# Patient Record
Sex: FEMALE | Race: BLACK OR AFRICAN AMERICAN | NOT HISPANIC OR LATINO | Employment: OTHER | URBAN - METROPOLITAN AREA
[De-identification: names, ages, dates, MRNs, and addresses within clinical notes are randomized per-mention and may not be internally consistent; named-entity substitution may affect disease eponyms.]

---

## 2020-11-17 ENCOUNTER — APPOINTMENT (OUTPATIENT)
Dept: CT IMAGING | Facility: HOSPITAL | Age: 65
End: 2020-11-17

## 2020-11-17 ENCOUNTER — APPOINTMENT (OUTPATIENT)
Dept: GENERAL RADIOLOGY | Facility: HOSPITAL | Age: 65
End: 2020-11-17

## 2020-11-17 ENCOUNTER — HOSPITAL ENCOUNTER (OUTPATIENT)
Facility: HOSPITAL | Age: 65
Setting detail: OBSERVATION
Discharge: HOME HEALTH | End: 2020-11-21
Attending: EMERGENCY MEDICINE | Admitting: HOSPITALIST

## 2020-11-17 DIAGNOSIS — F31.9 BIPOLAR AFFECTIVE DISORDER, REMISSION STATUS UNSPECIFIED (HCC): ICD-10-CM

## 2020-11-17 DIAGNOSIS — R41.82 ALTERED MENTAL STATUS, UNSPECIFIED ALTERED MENTAL STATUS TYPE: Primary | ICD-10-CM

## 2020-11-17 DIAGNOSIS — R40.0 SOMNOLENCE: ICD-10-CM

## 2020-11-17 DIAGNOSIS — F25.0 SCHIZOAFFECTIVE DISORDER, BIPOLAR TYPE (HCC): ICD-10-CM

## 2020-11-17 DIAGNOSIS — I10 ESSENTIAL HYPERTENSION: ICD-10-CM

## 2020-11-17 LAB
ALBUMIN SERPL-MCNC: 3.5 G/DL (ref 3.5–5.2)
ALBUMIN/GLOB SERPL: 1.1 G/DL
ALP SERPL-CCNC: 90 U/L (ref 39–117)
ALT SERPL W P-5'-P-CCNC: 15 U/L (ref 1–33)
AMPHET+METHAMPHET UR QL: NEGATIVE
ANION GAP SERPL CALCULATED.3IONS-SCNC: 8.7 MMOL/L (ref 5–15)
ARTERIAL PATENCY WRIST A: POSITIVE
AST SERPL-CCNC: 24 U/L (ref 1–32)
ATMOSPHERIC PRESS: 764.6 MMHG
B PARAPERT DNA SPEC QL NAA+PROBE: NOT DETECTED
B PERT DNA SPEC QL NAA+PROBE: NOT DETECTED
BACTERIA UR QL AUTO: ABNORMAL /HPF
BARBITURATES UR QL SCN: NEGATIVE
BASE EXCESS BLDA CALC-SCNC: 0.6 MMOL/L (ref 0–2)
BASOPHILS # BLD AUTO: 0.03 10*3/MM3 (ref 0–0.2)
BASOPHILS NFR BLD AUTO: 0.5 % (ref 0–1.5)
BDY SITE: ABNORMAL
BENZODIAZ UR QL SCN: POSITIVE
BILIRUB SERPL-MCNC: 0.6 MG/DL (ref 0–1.2)
BILIRUB UR QL STRIP: NEGATIVE
BUN SERPL-MCNC: 7 MG/DL (ref 8–23)
BUN/CREAT SERPL: 7.9 (ref 7–25)
C PNEUM DNA NPH QL NAA+NON-PROBE: NOT DETECTED
CALCIUM SPEC-SCNC: 9.4 MG/DL (ref 8.6–10.5)
CANNABINOIDS SERPL QL: NEGATIVE
CHLORIDE SERPL-SCNC: 105 MMOL/L (ref 98–107)
CLARITY UR: CLEAR
CO2 SERPL-SCNC: 24.3 MMOL/L (ref 22–29)
COCAINE UR QL: NEGATIVE
COLOR UR: ABNORMAL
CREAT SERPL-MCNC: 0.89 MG/DL (ref 0.57–1)
DEPRECATED RDW RBC AUTO: 44.2 FL (ref 37–54)
EOSINOPHIL # BLD AUTO: 0.1 10*3/MM3 (ref 0–0.4)
EOSINOPHIL NFR BLD AUTO: 1.6 % (ref 0.3–6.2)
ERYTHROCYTE [DISTWIDTH] IN BLOOD BY AUTOMATED COUNT: 13.1 % (ref 12.3–15.4)
ETHANOL BLD-MCNC: <10 MG/DL (ref 0–10)
ETHANOL UR QL: <0.01 %
FLUAV SUBTYP SPEC NAA+PROBE: NOT DETECTED
FLUBV RNA ISLT QL NAA+PROBE: NOT DETECTED
GFR SERPL CREATININE-BSD FRML MDRD: 77 ML/MIN/1.73
GLOBULIN UR ELPH-MCNC: 3.3 GM/DL
GLUCOSE BLDC GLUCOMTR-MCNC: 91 MG/DL (ref 70–130)
GLUCOSE BLDC GLUCOMTR-MCNC: 94 MG/DL (ref 70–130)
GLUCOSE SERPL-MCNC: 97 MG/DL (ref 65–99)
GLUCOSE UR STRIP-MCNC: NEGATIVE MG/DL
HADV DNA SPEC NAA+PROBE: NOT DETECTED
HCO3 BLDA-SCNC: 25.9 MMOL/L (ref 22–28)
HCOV 229E RNA SPEC QL NAA+PROBE: NOT DETECTED
HCOV HKU1 RNA SPEC QL NAA+PROBE: NOT DETECTED
HCOV NL63 RNA SPEC QL NAA+PROBE: NOT DETECTED
HCOV OC43 RNA SPEC QL NAA+PROBE: NOT DETECTED
HCT VFR BLD AUTO: 48.6 % (ref 34–46.6)
HGB BLD-MCNC: 16.2 G/DL (ref 12–15.9)
HGB UR QL STRIP.AUTO: ABNORMAL
HMPV RNA NPH QL NAA+NON-PROBE: NOT DETECTED
HOLD SPECIMEN: NORMAL
HOLD SPECIMEN: NORMAL
HPIV1 RNA SPEC QL NAA+PROBE: NOT DETECTED
HPIV2 RNA SPEC QL NAA+PROBE: NOT DETECTED
HPIV3 RNA NPH QL NAA+PROBE: NOT DETECTED
HPIV4 P GENE NPH QL NAA+PROBE: NOT DETECTED
HYALINE CASTS UR QL AUTO: ABNORMAL /LPF
IMM GRANULOCYTES # BLD AUTO: 0.02 10*3/MM3 (ref 0–0.05)
IMM GRANULOCYTES NFR BLD AUTO: 0.3 % (ref 0–0.5)
INHALED O2 CONCENTRATION: 21 %
KETONES UR QL STRIP: ABNORMAL
LEUKOCYTE ESTERASE UR QL STRIP.AUTO: NEGATIVE
LYMPHOCYTES # BLD AUTO: 2.21 10*3/MM3 (ref 0.7–3.1)
LYMPHOCYTES NFR BLD AUTO: 35.8 % (ref 19.6–45.3)
M PNEUMO IGG SER IA-ACNC: NOT DETECTED
MAGNESIUM SERPL-MCNC: 2 MG/DL (ref 1.6–2.4)
MCH RBC QN AUTO: 31.2 PG (ref 26.6–33)
MCHC RBC AUTO-ENTMCNC: 33.3 G/DL (ref 31.5–35.7)
MCV RBC AUTO: 93.5 FL (ref 79–97)
METHADONE UR QL SCN: NEGATIVE
MODALITY: ABNORMAL
MONOCYTES # BLD AUTO: 0.35 10*3/MM3 (ref 0.1–0.9)
MONOCYTES NFR BLD AUTO: 5.7 % (ref 5–12)
MUCOUS THREADS URNS QL MICRO: ABNORMAL /HPF
NEUTROPHILS NFR BLD AUTO: 3.47 10*3/MM3 (ref 1.7–7)
NEUTROPHILS NFR BLD AUTO: 56.1 % (ref 42.7–76)
NITRITE UR QL STRIP: NEGATIVE
NRBC BLD AUTO-RTO: 0 /100 WBC (ref 0–0.2)
O2 A-A PPRESDIFF RESPIRATORY: 0.6 MMHG
OPIATES UR QL: POSITIVE
OXYCODONE UR QL SCN: NEGATIVE
PCO2 BLDA: 42.5 MM HG (ref 35–45)
PH BLDA: 7.39 PH UNITS (ref 7.35–7.45)
PH UR STRIP.AUTO: <=5 [PH] (ref 5–8)
PLATELET # BLD AUTO: 170 10*3/MM3 (ref 140–450)
PMV BLD AUTO: 11.4 FL (ref 6–12)
PO2 BLDA: 69.7 MM HG (ref 80–100)
POTASSIUM SERPL-SCNC: 4.1 MMOL/L (ref 3.5–5.2)
PROT SERPL-MCNC: 6.8 G/DL (ref 6–8.5)
PROT UR QL STRIP: NEGATIVE
QT INTERVAL: 366 MS
RBC # BLD AUTO: 5.2 10*6/MM3 (ref 3.77–5.28)
RBC # UR: ABNORMAL /HPF
REF LAB TEST METHOD: ABNORMAL
RHINOVIRUS RNA SPEC NAA+PROBE: NOT DETECTED
RSV RNA NPH QL NAA+NON-PROBE: NOT DETECTED
SAO2 % BLDCOA: 93.5 % (ref 92–99)
SARS-COV-2 RNA NPH QL NAA+NON-PROBE: NOT DETECTED
SODIUM SERPL-SCNC: 138 MMOL/L (ref 136–145)
SP GR UR STRIP: 1.02 (ref 1–1.03)
SQUAMOUS #/AREA URNS HPF: ABNORMAL /HPF
TOTAL RATE: 16 BREATHS/MINUTE
TROPONIN T SERPL-MCNC: <0.01 NG/ML (ref 0–0.03)
UROBILINOGEN UR QL STRIP: ABNORMAL
WBC # BLD AUTO: 6.18 10*3/MM3 (ref 3.4–10.8)
WBC UR QL AUTO: ABNORMAL /HPF
WHOLE BLOOD HOLD SPECIMEN: NORMAL
WHOLE BLOOD HOLD SPECIMEN: NORMAL

## 2020-11-17 PROCEDURE — 80307 DRUG TEST PRSMV CHEM ANLYZR: CPT | Performed by: EMERGENCY MEDICINE

## 2020-11-17 PROCEDURE — 71045 X-RAY EXAM CHEST 1 VIEW: CPT

## 2020-11-17 PROCEDURE — 81001 URINALYSIS AUTO W/SCOPE: CPT | Performed by: EMERGENCY MEDICINE

## 2020-11-17 PROCEDURE — G0378 HOSPITAL OBSERVATION PER HR: HCPCS

## 2020-11-17 PROCEDURE — 70450 CT HEAD/BRAIN W/O DYE: CPT

## 2020-11-17 PROCEDURE — 99285 EMERGENCY DEPT VISIT HI MDM: CPT

## 2020-11-17 PROCEDURE — 80053 COMPREHEN METABOLIC PANEL: CPT | Performed by: EMERGENCY MEDICINE

## 2020-11-17 PROCEDURE — 82962 GLUCOSE BLOOD TEST: CPT

## 2020-11-17 PROCEDURE — 82803 BLOOD GASES ANY COMBINATION: CPT

## 2020-11-17 PROCEDURE — 93005 ELECTROCARDIOGRAM TRACING: CPT | Performed by: EMERGENCY MEDICINE

## 2020-11-17 PROCEDURE — 0202U NFCT DS 22 TRGT SARS-COV-2: CPT | Performed by: EMERGENCY MEDICINE

## 2020-11-17 PROCEDURE — 83735 ASSAY OF MAGNESIUM: CPT | Performed by: EMERGENCY MEDICINE

## 2020-11-17 PROCEDURE — 93005 ELECTROCARDIOGRAM TRACING: CPT

## 2020-11-17 PROCEDURE — 93010 ELECTROCARDIOGRAM REPORT: CPT | Performed by: INTERNAL MEDICINE

## 2020-11-17 PROCEDURE — 36600 WITHDRAWAL OF ARTERIAL BLOOD: CPT

## 2020-11-17 PROCEDURE — 84484 ASSAY OF TROPONIN QUANT: CPT | Performed by: EMERGENCY MEDICINE

## 2020-11-17 PROCEDURE — 85025 COMPLETE CBC W/AUTO DIFF WBC: CPT | Performed by: EMERGENCY MEDICINE

## 2020-11-17 RX ORDER — ISOSORBIDE MONONITRATE 30 MG/1
30 TABLET, EXTENDED RELEASE ORAL DAILY
COMMUNITY
Start: 2020-10-26 | End: 2020-11-25

## 2020-11-17 RX ORDER — MIRTAZAPINE 45 MG/1
1 TABLET, FILM COATED ORAL
COMMUNITY
Start: 2020-09-02

## 2020-11-17 RX ORDER — POTASSIUM CHLORIDE 20 MEQ/1
20 TABLET, EXTENDED RELEASE ORAL 3 TIMES DAILY
COMMUNITY
Start: 2020-10-28 | End: 2021-01-26

## 2020-11-17 RX ORDER — HYDROCODONE BITARTRATE AND ACETAMINOPHEN 7.5; 325 MG/1; MG/1
1 TABLET ORAL EVERY 8 HOURS PRN
COMMUNITY
End: 2020-11-21 | Stop reason: HOSPADM

## 2020-11-17 RX ORDER — ZOLPIDEM TARTRATE 10 MG/1
10 TABLET ORAL NIGHTLY PRN
COMMUNITY
End: 2020-11-21 | Stop reason: HOSPADM

## 2020-11-17 RX ORDER — SODIUM CHLORIDE 0.9 % (FLUSH) 0.9 %
10 SYRINGE (ML) INJECTION AS NEEDED
Status: DISCONTINUED | OUTPATIENT
Start: 2020-11-17 | End: 2020-11-21 | Stop reason: HOSPADM

## 2020-11-17 RX ORDER — CLONAZEPAM 1 MG/1
1 TABLET ORAL 2 TIMES DAILY PRN
Status: ON HOLD | COMMUNITY
End: 2020-11-21 | Stop reason: SDUPTHER

## 2020-11-17 RX ORDER — AMOXICILLIN 250 MG
1 CAPSULE ORAL DAILY
COMMUNITY

## 2020-11-17 RX ORDER — QUETIAPINE FUMARATE 300 MG/1
600 TABLET, FILM COATED ORAL NIGHTLY
COMMUNITY
End: 2020-11-21 | Stop reason: HOSPADM

## 2020-11-17 RX ORDER — ISOSORBIDE MONONITRATE 30 MG/1
30 TABLET, EXTENDED RELEASE ORAL DAILY
Status: DISCONTINUED | OUTPATIENT
Start: 2020-11-18 | End: 2020-11-21 | Stop reason: HOSPADM

## 2020-11-17 RX ORDER — NICOTINE 21 MG/24HR
1 PATCH, TRANSDERMAL 24 HOURS TRANSDERMAL EVERY 24 HOURS
COMMUNITY
Start: 2020-10-27 | End: 2021-01-25

## 2020-11-17 RX ORDER — LIOTHYRONINE SODIUM 25 UG/1
25 TABLET ORAL DAILY
Status: DISCONTINUED | OUTPATIENT
Start: 2020-11-18 | End: 2020-11-21 | Stop reason: HOSPADM

## 2020-11-17 RX ORDER — ASPIRIN 81 MG/1
81 TABLET ORAL DAILY
Status: DISCONTINUED | OUTPATIENT
Start: 2020-11-18 | End: 2020-11-21 | Stop reason: HOSPADM

## 2020-11-17 RX ORDER — LISINOPRIL 10 MG/1
10 TABLET ORAL DAILY
Status: DISCONTINUED | OUTPATIENT
Start: 2020-11-18 | End: 2020-11-21 | Stop reason: HOSPADM

## 2020-11-17 RX ORDER — CLONAZEPAM 1 MG/1
1 TABLET ORAL 2 TIMES DAILY PRN
Status: DISCONTINUED | OUTPATIENT
Start: 2020-11-17 | End: 2020-11-19

## 2020-11-17 RX ORDER — LISINOPRIL 10 MG/1
10 TABLET ORAL DAILY
COMMUNITY

## 2020-11-17 RX ORDER — METOPROLOL SUCCINATE 50 MG/1
50 TABLET, EXTENDED RELEASE ORAL DAILY
COMMUNITY
Start: 2020-10-26 | End: 2020-11-25

## 2020-11-17 RX ORDER — POTASSIUM CHLORIDE 750 MG/1
20 TABLET, FILM COATED, EXTENDED RELEASE ORAL 3 TIMES DAILY
Status: DISCONTINUED | OUTPATIENT
Start: 2020-11-17 | End: 2020-11-21 | Stop reason: HOSPADM

## 2020-11-17 RX ORDER — QUETIAPINE FUMARATE 200 MG/1
600 TABLET, FILM COATED ORAL NIGHTLY
Status: DISCONTINUED | OUTPATIENT
Start: 2020-11-17 | End: 2020-11-19

## 2020-11-17 RX ORDER — NICOTINE 21 MG/24HR
1 PATCH, TRANSDERMAL 24 HOURS TRANSDERMAL EVERY 24 HOURS
Status: DISCONTINUED | OUTPATIENT
Start: 2020-11-17 | End: 2020-11-21 | Stop reason: HOSPADM

## 2020-11-17 RX ORDER — ASPIRIN 81 MG/1
81 TABLET ORAL DAILY
COMMUNITY
Start: 2020-10-26 | End: 2020-11-25

## 2020-11-17 RX ORDER — AMOXICILLIN 250 MG
1 CAPSULE ORAL DAILY
Status: DISCONTINUED | OUTPATIENT
Start: 2020-11-18 | End: 2020-11-21 | Stop reason: HOSPADM

## 2020-11-17 RX ORDER — LIOTHYRONINE SODIUM 25 UG/1
1 TABLET ORAL DAILY
COMMUNITY
Start: 2020-09-02

## 2020-11-17 RX ORDER — METOPROLOL SUCCINATE 50 MG/1
50 TABLET, EXTENDED RELEASE ORAL DAILY
Status: DISCONTINUED | OUTPATIENT
Start: 2020-11-18 | End: 2020-11-21 | Stop reason: HOSPADM

## 2020-11-17 NOTE — ED TRIAGE NOTES
Pt is confused since hospital stay for 6 days a month ago.  Ems does not know where she was admitted or what her dx was.  She is unsteady on her feet and has had increased falls.  Is on 2L O2 at baseline    Patient was placed in face mask during first look triage.  Patient was wearing a face mask throughout encounter.  I wore personal protective equipment throughout the encounter.  Hand hygiene was performed before and after patient encounter.

## 2020-11-17 NOTE — ED PROVIDER NOTES
EMERGENCY DEPARTMENT ENCOUNTER    Room Number:  P686/1  Date of encounter:  11/19/2020  PCP: Dixon Miller MD  Historian: EMS and patient      HPI:  Chief Complaint: Altered mental status  A complete HPI/ROS/PMH/PSH/SH/FH are unobtainable due to: Patient is confused and is not able to give a complete history.  Context: Sarah Lopez is a 65 y.o. female who presents to the ED c/o altered mental status.  EMS transported this patient to emergency department.  She informed the triage nurse that this patient has been confused since she was in the hospital approximately 1 month ago.  She had a 6-day stay in the hospital.  EMS does not know which hospital.  She was confused and is remained confused since discharge..  Patient was transported from home.  Patient is uncertain why she was sent here.  She is aware that she is in the hospital.  She thinks that her daughter may have called EMS.  But does not know for certain.  She denies any pain or denies any complaint.  She is unaware of any past medical history that she has.  She is unaware of any recent hospitalization or what facility she was at.  Again she is unaware of any medicines.  Denies any cough or cold symptoms.        Previous Episodes: Unknown  Current Symptoms: Altered mental status.  No family or friends are present here in the emergency department    MEDICAL HISTORY REVIEWED    Try to review records in epic as well as in the [facility] system in the [facility] in [place] as well as the [facility] system here and I cannot find any records on this patient.    PAST MEDICAL HISTORY  Active Ambulatory Problems     Diagnosis Date Noted   • No Active Ambulatory Problems     Resolved Ambulatory Problems     Diagnosis Date Noted   • No Resolved Ambulatory Problems     Past Medical History:   Diagnosis Date   • Anxiety    • COPD (chronic obstructive pulmonary disease) (CMS/[facility])    • Coronary artery disease    • Depression    • Hypertension           PAST SURGICAL HISTORY  Past Surgical History:   Procedure Laterality Date   • HYSTERECTOMY           FAMILY HISTORY  History reviewed. No pertinent family history.      SOCIAL HISTORY  Social History     Socioeconomic History   • Marital status: Single     Spouse name: Not on file   • Number of children: Not on file   • Years of education: Not on file   • Highest education level: Not on file   Tobacco Use   • Smoking status: Former Smoker     Quit date: 10/23/2020     Years since quittin.0   Substance and Sexual Activity   • Alcohol use: Not Currently   • Drug use: Not Currently   • Sexual activity: Defer         ALLERGIES  Patient has no known allergies.        REVIEW OF SYSTEMS  Review of Systems     All systems reviewed and negative except for those discussed in HPI.       PHYSICAL EXAM    I have reviewed the triage vital signs and nursing notes.    ED Triage Vitals [20 1436]   Temp Heart Rate Resp BP SpO2   98.2 °F (36.8 °C) 96 16 134/87 98 %      Temp src Heart Rate Source Patient Position BP Location FiO2 (%)   Tympanic Monitor -- -- --       GENERAL: Elderly female no acute distress.Vital signs on my initial evaluation unremarkable  HENT: nares patent  Head/neck/ face are symmetric without gross deformity, signs of trauma, or swelling  EYES: no scleral icterus, no conjunctival pallor.  NECK: Supple, no meningismus  CV: regular rhythm, regular rate with intact distal pulses.  No murmur or rub  RESPIRATORY: normal effort and no respiratory distress.  Clear to auscultation bilaterally  ABDOMEN: soft and non-tender.  Morbidly obese  MUSCULOSKELETAL: no deformity.  1+ edema to lower extremities that are equal and symmetric.  NEURO: alert and appropriate, moves all extremities, follows commands.  Alert to her name and her age.  She is aware that she is in the hospital.  She is confused to the month and the year.  She will follow commands her cranial nerves II through XII are grossly intact.  She  has no drift to her upper extremities or lower extremities bilaterally.  She appears to be diffusely weak.  I do not appreciate any obvious aphasia or dysarthria.  But she is very forgetful and appears to have a cognitive impairment.  SKIN: warm, dry    Vital signs and nursing notes reviewed.        LAB RESULTS  Recent Results (from the past 24 hour(s))   POC Glucose Once    Collection Time: 11/19/20  6:06 AM    Specimen: Blood   Result Value Ref Range    Glucose 100 70 - 130 mg/dL   CBC (No Diff)    Collection Time: 11/19/20  7:07 AM    Specimen: Blood   Result Value Ref Range    WBC 5.52 3.40 - 10.80 10*3/mm3    RBC 4.84 3.77 - 5.28 10*6/mm3    Hemoglobin 14.7 12.0 - 15.9 g/dL    Hematocrit 45.4 34.0 - 46.6 %    MCV 93.8 79.0 - 97.0 fL    MCH 30.4 26.6 - 33.0 pg    MCHC 32.4 31.5 - 35.7 g/dL    RDW 13.2 12.3 - 15.4 %    RDW-SD 45.2 37.0 - 54.0 fl    MPV 11.1 6.0 - 12.0 fL    Platelets 148 140 - 450 10*3/mm3   Basic Metabolic Panel    Collection Time: 11/19/20  7:07 AM    Specimen: Hand, Left; Blood   Result Value Ref Range    Glucose 109 (H) 65 - 99 mg/dL    BUN 7 (L) 8 - 23 mg/dL    Creatinine 0.76 0.57 - 1.00 mg/dL    Sodium 140 136 - 145 mmol/L    Potassium 4.2 3.5 - 5.2 mmol/L    Chloride 109 (H) 98 - 107 mmol/L    CO2 23.8 22.0 - 29.0 mmol/L    Calcium 8.7 8.6 - 10.5 mg/dL    eGFR  African Amer 93 >60 mL/min/1.73    BUN/Creatinine Ratio 9.2 7.0 - 25.0    Anion Gap 7.2 5.0 - 15.0 mmol/L   TSH    Collection Time: 11/19/20  7:07 AM    Specimen: Hand, Left; Blood   Result Value Ref Range    TSH 0.956 0.270 - 4.200 uIU/mL   T4, Free    Collection Time: 11/19/20  7:07 AM    Specimen: Hand, Left; Blood   Result Value Ref Range    Free T4 0.49 (L) 0.93 - 1.70 ng/dL   T3, Free    Collection Time: 11/19/20  7:07 AM    Specimen: Hand, Left; Blood   Result Value Ref Range    T3, Free 2.13 2.00 - 4.40 pg/mL       Ordered the above labs and independently reviewed the results.        RADIOLOGY  No Radiology Exams Resulted  Within Past 24 Hours    I ordered the above noted radiological studies. Reviewed by me and discussed with radiologist.  See dictation for official radiology interpretation.      PROCEDURES    Procedures      MEDICATIONS GIVEN IN ER    Medications   sodium chloride 0.9 % flush 10 mL (has no administration in time range)   aspirin EC tablet 81 mg (81 mg Oral Given 11/19/20 0836)   isosorbide mononitrate (IMDUR) 24 hr tablet 30 mg (30 mg Oral Given 11/19/20 0836)   liothyronine (CYTOMEL) tablet 25 mcg (25 mcg Oral Given 11/19/20 0836)   lisinopril (PRINIVIL,ZESTRIL) tablet 10 mg (10 mg Oral Given 11/19/20 0836)   metoprolol succinate XL (TOPROL-XL) 24 hr tablet 50 mg (50 mg Oral Given 11/19/20 0836)   mirtazapine (REMERON) tablet 45 mg (45 mg Oral Given 11/19/20 2059)   nicotine (NICODERM CQ) 21 MG/24HR patch 1 patch (1 patch Transdermal Medication Applied 11/19/20 0056)   potassium chloride (K-DUR,KLOR-CON) ER tablet 20 mEq (20 mEq Oral Given 11/19/20 2059)   sennosides-docusate (PERICOLACE) 8.6-50 MG per tablet 1 tablet (1 tablet Oral Given 11/19/20 0836)   sodium chloride 0.9 % flush 10 mL (10 mL Intravenous Given 11/19/20 2100)   sodium chloride 0.9 % flush 10 mL (has no administration in time range)   Pharmacy to Dose enoxaparin (LOVENOX) (has no administration in time range)   acetaminophen (TYLENOL) tablet 650 mg (650 mg Oral Given 11/19/20 0840)   ondansetron (ZOFRAN) tablet 4 mg (has no administration in time range)     Or   ondansetron (ZOFRAN) injection 4 mg (has no administration in time range)   enoxaparin (LOVENOX) syringe 40 mg (40 mg Subcutaneous Given 11/19/20 1139)   albuterol (ACCUNEB) nebulizer solution 0.63 mg (has no administration in time range)   ipratropium-albuterol (DUO-NEB) nebulizer solution 3 mL (3 mL Nebulization Given 11/19/20 1616)   clonazePAM (KlonoPIN) tablet 0.5 mg (has no administration in time range)   QUEtiapine (SEROquel) tablet 400 mg (400 mg Oral Given 11/19/20 2059)          PROGRESS, DATA ANALYSIS, CONSULTS, AND MEDICAL DECISION MAKING    We will try to attempt again touch with the family member.  I have looked in old records in epic and looked at outside records and I have no old records on this patient.  We will check blood work electrolytes and a CT scan of her head.  She is very stable and in no distress.    All labs have been independently reviewed by me.  All radiology studies have been reviewed by me and discussed with radiologist dictating the report.   EKG's independently viewed and interpreted by me.  Discussion below represents my analysis of pertinent findings related to patient's condition, differential diagnosis, treatment plan and final disposition.      ED Course as of Nov 19 2228   Tue Nov 17, 2020 1917 I spoke with the patient's daughter at length.  Patient's daughter stated that she was admitted to [facility] for 5 days on October 19.  EMS was called because they found the patient unresponsive and foaming at the mouth.  Patient need to be intubated.  The daughter stated that her carbon dioxide level got too high.  She was then discharged on October 24 or 25.  They said that she had COPD and there when put her on oxygen to go home with.  Since that time the patient has continued to decline.  She describes the change in her behavior as if sometimes she is in a childlike state.  She will sometimes give a blank stare.  She will not be as responsive and answering questions and she will confuse easily.  Her memory seems to be more impaired as well.  She also mentioned that she is been hospitalized in the past for but told her disease.  She sees a psychiatrist.  The patient controls her own medicines and the daughter is aware that she takes Ambien, Remeron, high blood pressure medicines, hydrocodone for pain medicine, and lorazepam.  There might be a couple other medicines at the daughter does not remember.  Daughter is unaware of the last time she took these  medicines.  Again patient takes these medicines on her own accord.  Daughter did called EMS today she believes this is more psychiatric in origin.  There is been no report of fever coughs or colds.  No vomiting or diarrhea.    [MM]   1942 Discussed with Dr. Kearns who agrees to admit this patient to the hospital.  I also gave Dr. Kearns the daughter's number.    [MM]   1944 On reevaluation patient seems a little more awake.  She is alert and oriented to name age.  She had to think about it and she was able to get that the month is November and is 2020.  I did tell that when I initially saw her.  She is actually more awake and more responsive than initial evaluation.  Hemodynamically she is stable.    [MM]      ED Course User Index  [MM] Michael Barba MD       AS OF 22:28 EST VITALS:    BP - 138/81  HR - 89  TEMP - 96.8 °F (36 °C) (Oral)  02 SATS - 97%        DIAGNOSIS  Final diagnoses:   Altered mental status, unspecified altered mental status type   Bipolar affective disorder, remission status unspecified (CMS/[facility])         DISPOSITION  I have reviewed the test results with my patient and explained the current treatment plan.  I answered all of the patient's questions.  The patient will be admitted to monitor bed at this time.  The patient is not hypotensive and is tolerating their current disease condition well enough for a monitored bed at this time.  The patient's current condition does not require intensive care treatment at this time.             Michael Barba MD  11/19/20 [id number]

## 2020-11-18 PROBLEM — I10 ESSENTIAL HYPERTENSION: Status: ACTIVE | Noted: 2020-11-18

## 2020-11-18 PROBLEM — J44.9 COPD (CHRONIC OBSTRUCTIVE PULMONARY DISEASE) (HCC): Status: ACTIVE | Noted: 2020-11-18

## 2020-11-18 PROBLEM — Z86.711 HISTORY OF PULMONARY EMBOLUS (PE): Status: ACTIVE | Noted: 2020-11-18

## 2020-11-18 PROBLEM — E03.9 ACQUIRED HYPOTHYROIDISM: Status: ACTIVE | Noted: 2020-11-18

## 2020-11-18 PROBLEM — F25.0 SCHIZOAFFECTIVE DISORDER, BIPOLAR TYPE (HCC): Status: ACTIVE | Noted: 2020-11-18

## 2020-11-18 LAB — GLUCOSE BLDC GLUCOMTR-MCNC: 94 MG/DL (ref 70–130)

## 2020-11-18 PROCEDURE — G0378 HOSPITAL OBSERVATION PER HR: HCPCS

## 2020-11-18 PROCEDURE — 96372 THER/PROPH/DIAG INJ SC/IM: CPT

## 2020-11-18 PROCEDURE — 94640 AIRWAY INHALATION TREATMENT: CPT

## 2020-11-18 PROCEDURE — 90791 PSYCH DIAGNOSTIC EVALUATION: CPT

## 2020-11-18 PROCEDURE — 94799 UNLISTED PULMONARY SVC/PX: CPT

## 2020-11-18 PROCEDURE — 82962 GLUCOSE BLOOD TEST: CPT

## 2020-11-18 PROCEDURE — 25010000002 ENOXAPARIN PER 10 MG: Performed by: NURSE PRACTITIONER

## 2020-11-18 RX ORDER — SODIUM CHLORIDE 0.9 % (FLUSH) 0.9 %
10 SYRINGE (ML) INJECTION AS NEEDED
Status: DISCONTINUED | OUTPATIENT
Start: 2020-11-18 | End: 2020-11-21 | Stop reason: HOSPADM

## 2020-11-18 RX ORDER — SODIUM CHLORIDE 0.9 % (FLUSH) 0.9 %
10 SYRINGE (ML) INJECTION EVERY 12 HOURS SCHEDULED
Status: DISCONTINUED | OUTPATIENT
Start: 2020-11-18 | End: 2020-11-21 | Stop reason: HOSPADM

## 2020-11-18 RX ORDER — ONDANSETRON 2 MG/ML
4 INJECTION INTRAMUSCULAR; INTRAVENOUS EVERY 6 HOURS PRN
Status: DISCONTINUED | OUTPATIENT
Start: 2020-11-18 | End: 2020-11-21 | Stop reason: HOSPADM

## 2020-11-18 RX ORDER — ONDANSETRON 4 MG/1
4 TABLET, FILM COATED ORAL EVERY 6 HOURS PRN
Status: DISCONTINUED | OUTPATIENT
Start: 2020-11-18 | End: 2020-11-21 | Stop reason: HOSPADM

## 2020-11-18 RX ORDER — ALBUTEROL SULFATE 0.63 MG/3ML
0.63 SOLUTION RESPIRATORY (INHALATION) EVERY 6 HOURS PRN
Status: DISCONTINUED | OUTPATIENT
Start: 2020-11-18 | End: 2020-11-21 | Stop reason: HOSPADM

## 2020-11-18 RX ORDER — ACETAMINOPHEN 325 MG/1
650 TABLET ORAL EVERY 4 HOURS PRN
Status: DISCONTINUED | OUTPATIENT
Start: 2020-11-18 | End: 2020-11-21 | Stop reason: HOSPADM

## 2020-11-18 RX ORDER — IPRATROPIUM BROMIDE AND ALBUTEROL SULFATE 2.5; .5 MG/3ML; MG/3ML
3 SOLUTION RESPIRATORY (INHALATION)
Status: DISCONTINUED | OUTPATIENT
Start: 2020-11-18 | End: 2020-11-21 | Stop reason: HOSPADM

## 2020-11-18 RX ADMIN — LIOTHYRONINE SODIUM 25 MCG: 25 TABLET ORAL at 10:58

## 2020-11-18 RX ADMIN — POTASSIUM CHLORIDE 20 MEQ: 750 TABLET, EXTENDED RELEASE ORAL at 10:59

## 2020-11-18 RX ADMIN — ASPIRIN 81 MG: 81 TABLET, FILM COATED ORAL at 10:59

## 2020-11-18 RX ADMIN — ISOSORBIDE MONONITRATE 30 MG: 30 TABLET ORAL at 10:59

## 2020-11-18 RX ADMIN — SODIUM CHLORIDE, PRESERVATIVE FREE 10 ML: 5 INJECTION INTRAVENOUS at 22:28

## 2020-11-18 RX ADMIN — IPRATROPIUM BROMIDE AND ALBUTEROL SULFATE 3 ML: 2.5; .5 SOLUTION RESPIRATORY (INHALATION) at 23:12

## 2020-11-18 RX ADMIN — QUETIAPINE FUMARATE 600 MG: 200 TABLET ORAL at 22:27

## 2020-11-18 RX ADMIN — ENOXAPARIN SODIUM 40 MG: 100 INJECTION SUBCUTANEOUS at 16:11

## 2020-11-18 RX ADMIN — SODIUM CHLORIDE, PRESERVATIVE FREE 10 ML: 5 INJECTION INTRAVENOUS at 16:11

## 2020-11-18 RX ADMIN — POTASSIUM CHLORIDE 20 MEQ: 750 TABLET, EXTENDED RELEASE ORAL at 16:11

## 2020-11-18 RX ADMIN — METOPROLOL SUCCINATE 50 MG: 50 TABLET, EXTENDED RELEASE ORAL at 10:59

## 2020-11-18 RX ADMIN — LISINOPRIL 10 MG: 10 TABLET ORAL at 10:59

## 2020-11-18 RX ADMIN — MIRTAZAPINE 45 MG: 30 TABLET, FILM COATED ORAL at 22:27

## 2020-11-18 RX ADMIN — DOCUSATE SODIUM 50MG AND SENNOSIDES 8.6MG 1 TABLET: 8.6; 5 TABLET, FILM COATED ORAL at 10:59

## 2020-11-18 RX ADMIN — POTASSIUM CHLORIDE 20 MEQ: 750 TABLET, EXTENDED RELEASE ORAL at 22:28

## 2020-11-18 NOTE — PROGRESS NOTES
Pharmacy consult for Lovenox dosing    Sarah Lopez is a 65 y.o. female 106 kg (233 lb 3.2 oz).    Indication:VTE prophylaxis   Physician:Melani HAN    Estimated Creatinine Clearance: 77.6 mL/min (by C-G formula based on SCr of 0.89 mg/dL).  Creatinine   Date Value Ref Range Status   11/17/2020 0.89 0.57 - 1.00 mg/dL Final     Platelets   Date Value Ref Range Status   11/17/2020 170 140 - 450 10*3/mm3 Final     Hematocrit   Date Value Ref Range Status   11/17/2020 48.6 (H) 34.0 - 46.6 % Final     Hemoglobin   Date Value Ref Range Status   11/17/2020 16.2 (H) 12.0 - 15.9 g/dL Final         PLAN:  Lovenox 40 mg sc Q 24 hr.  Will monitor and adjust as needed.      Nidia Hobbs PharmD, BCPS  11/18/20 11:15 EST

## 2020-11-18 NOTE — H&P
Patient Name:  Sarah Lopez  YOB: 1955  MRN:  [id number]  Admit Date:  11/17/2020  Patient Care Team:  Dixon Miller MD as PCP - General (Internal Medicine)      Subjective   History Present Illness     Chief Complaint   Patient presents with   • Altered Mental Status   • Weakness - Generalized       Ms. Lopez is a 65 y.o. female with a history of COPD, CAD, HTN, depression/anxiety, bipolar/schizoaffective disorder, hypothyroidism that presents to [facility] complaining of family concern of AMS. Pt is not a good historian, most information taken from ED/Psychiatry notes. According to daughter report, pt has diagnosis of bipolar/schizoaffective disorder, followed by [facility] psychiatry. Pt has had prior [facility] admission in 2011 following death of . She has apparently been managed well until recently when she had a hospitalization in October at [facility] due to COPD. Daughter has noticed more memory issues/forgetfulness for the past 6 months. Pt has been taking of her mother since October when she suffered a fall. Apparently pt has not been sleeping well despite use of Remeron, Seroquel, Ativan, and Ambien. No report of recent illnesses such as fever, increased shortness of breath, dysuria. On exam, pt was alert, oriented to month, year, place; she reported she was admitted because she had fallen although is not reported in records. No specific pain reported. She was able to state that she takes care of her mother. She reports having decreased appetite. She did seem distracted, slow to verbally respond at times.     Afebrile. Not hypotensive. Sats stable on O2 2L NC. RVP/Covid 19 negative. UA negative for infection. UDS + benzo/opiate. Electrolytes/renal function stable. Trop neg. Hgb 16, WBC 6. Ethanol < 10. CXR no focal consolidation. CTH negative.     Review of Systems   Constitutional: Positive for appetite change. Negative for fever.   HENT: Negative for congestion.     Respiratory: Negative for cough and shortness of breath.    Cardiovascular: Negative.    Gastrointestinal: Negative.    Genitourinary: Negative for dysuria.   Musculoskeletal: Negative.    Skin: Negative.    Neurological: Negative.    Psychiatric/Behavioral: Positive for confusion, decreased concentration and sleep disturbance.        Personal History     Past Medical History:   Diagnosis Date   • Anxiety    • COPD (chronic obstructive pulmonary disease) (CMS/[facility])    • Coronary artery disease    • Depression    • Hypertension      Past Surgical History:   Procedure Laterality Date   • HYSTERECTOMY       History reviewed. No pertinent family history.  Social History     Tobacco Use   • Smoking status: Former Smoker     Quit date: 10/23/2020     Years since quittin.0   Substance Use Topics   • Alcohol use: Not Currently   • Drug use: Not Currently     No current facility-administered medications on file prior to encounter.      Current Outpatient Medications on File Prior to Encounter   Medication Sig Dispense Refill   • aspirin (aspirin) 81 MG EC tablet Take 81 mg by mouth Daily.     • clonazePAM (KlonoPIN) 1 MG tablet Take 1 mg by mouth 2 (Two) Times a Day As Needed.     • HYDROcodone-acetaminophen (NORCO) 7.5-325 MG per tablet Take 1 tablet by mouth Every 8 (Eight) Hours As Needed for Moderate Pain .     • isosorbide mononitrate (IMDUR) 30 MG 24 hr tablet Take 30 mg by mouth Daily.     • liothyronine (CYTOMEL) 25 MCG tablet Take 1 tablet by mouth Daily.     • lisinopril (PRINIVIL,ZESTRIL) 10 MG tablet Take 10 mg by mouth Daily.     • metoprolol succinate XL (TOPROL-XL) 50 MG 24 hr tablet Take 50 mg by mouth Daily.     • mirtazapine (REMERON) 45 MG tablet Take 1 tablet by mouth every night at bedtime.     • nicotine (NICODERM CQ) 21 MG/24HR patch Place 1 patch on the skin as directed by provider Daily.     • potassium chloride (K-DUR,KLOR-CON) 20 MEQ CR tablet Take 20 mEq by mouth 3 (Three) Times a Day.     •  QUEtiapine (SEROquel) 300 MG tablet Take 600 mg by mouth Every Night.     • sennosides-docusate (Senexon-S) 8.6-50 MG per tablet Take 1 tablet by mouth Daily.     • zolpidem (Ambien) 10 MG tablet Take 10 mg by mouth At Night As Needed for Sleep.       No Known Allergies    Objective    Objective     Vital Signs  Temp:  [97.3 °F (36.3 °C)-98.4 °F (36.9 °C)] 98.2 °F (36.8 °C)  Heart Rate:  [85-96] 85  Resp:  [16-18] 16  BP: (134-150)/(87-92) 150/91  SpO2:  [91 %-98 %] 95 %  on  Flow (L/min):  [2] 2;   Device (Oxygen Therapy): nasal cannula  Body mass index is 37.64 kg/m².    Physical Exam  Vitals signs and nursing note reviewed.   Constitutional:       General: She is not in acute distress.     Appearance: She is obese.   HENT:      Head: Normocephalic.   Eyes:      Conjunctiva/sclera: Conjunctivae normal.   Neck:      Musculoskeletal: Normal range of motion and neck supple.   Cardiovascular:      Rate and Rhythm: Normal rate and regular rhythm.   Pulmonary:      Effort: Pulmonary effort is normal. No respiratory distress.      Breath sounds: Normal breath sounds.   Abdominal:      General: Bowel sounds are normal.      Palpations: Abdomen is soft.   Musculoskeletal:      Right lower leg: No edema.      Left lower leg: No edema.   Skin:     General: Skin is warm and dry.   Neurological:      General: No focal deficit present.      Mental Status: She is alert.   Psychiatric:         Mood and Affect: Mood normal.         Speech: Speech normal.         Behavior: Behavior is slowed.      Comments: Decreased attention; distracted at times         Results Review:  I reviewed the patient's new clinical results.  I reviewed the patient's new imaging results and agree with the interpretation.  I reviewed the patient's other test results and agree with the interpretation  I personally viewed and interpreted the patient's EKG/Telemetry data    Lab Results (last 24 hours)     Procedure Component Value Units Date/Time    Blood  Gas, Arterial - [[id number]]  (Abnormal) Collected: 11/17/20 1814    Specimen: Arterial Blood Updated: 11/17/20 1817     Site Arterial: left radial     Augusto's Test Positive     pH, Arterial 7.392 pH units      pCO2, Arterial 42.5 mm Hg      pO2, Arterial 69.7 mm Hg      HCO3, Arterial 25.9 mmol/L      Base Excess, Arterial 0.6 mmol/L      O2 Saturation Calculated 93.5 %      A-a Gradiant 0.6 mmHg      Barometric Pressure for Blood Gas 764.6 mmHg      Modality Room Air     FIO2 21 %      Rate 16 Breaths/minute     CBC & Differential [[id number]]  (Abnormal) Collected: 11/17/20 1818    Specimen: Blood Updated: 11/17/20 1835    Narrative:      The following orders were created for panel order CBC & Differential.  Procedure                               Abnormality         Status                     ---------                               -----------         ------                     CBC Auto Differential[[id number]]        Abnormal            Final result                 Please view results for these tests on the individual orders.    Comprehensive Metabolic Panel [[id number]]  (Abnormal) Collected: 11/17/20 1818    Specimen: Blood Updated: 11/17/20 1856     Glucose 97 mg/dL      BUN 7 mg/dL      Creatinine 0.89 mg/dL      Sodium 138 mmol/L      Potassium 4.1 mmol/L      Chloride 105 mmol/L      CO2 24.3 mmol/L      Calcium 9.4 mg/dL      Total Protein 6.8 g/dL      Albumin 3.50 g/dL      ALT (SGPT) 15 U/L      AST (SGOT) 24 U/L      Alkaline Phosphatase 90 U/L      Total Bilirubin 0.6 mg/dL      eGFR   Amer 77 mL/min/1.73      Globulin 3.3 gm/dL      A/G Ratio 1.1 g/dL      BUN/Creatinine Ratio 7.9     Anion Gap 8.7 mmol/L     Narrative:      GFR Normal >60  Chronic Kidney Disease <60  Kidney Failure <15      Troponin [[id number]]  (Normal) Collected: 11/17/20 1818    Specimen: Blood Updated: 11/17/20 1856     Troponin T <0.010 ng/mL     Narrative:      Troponin T Reference Range:  <= 0.03 ng/mL-   Negative for  AMI  >0.03 ng/mL-     Abnormal for myocardial necrosis.  Clinicians would have to utilize clinical acumen, EKG, Troponin and serial changes to determine if it is an Acute Myocardial Infarction or myocardial injury due to an underlying chronic condition.       Results may be falsely decreased if patient taking Biotin.      Magnesium [[id number]]  (Normal) Collected: 11/17/20 1818    Specimen: Blood Updated: 11/17/20 1856     Magnesium 2.0 mg/dL     CBC Auto Differential [[id number]]  (Abnormal) Collected: 11/17/20 1818    Specimen: Blood Updated: 11/17/20 1835     WBC 6.18 10*3/mm3      RBC 5.20 10*6/mm3      Hemoglobin 16.2 g/dL      Hematocrit 48.6 %      MCV 93.5 fL      MCH 31.2 pg      MCHC 33.3 g/dL      RDW 13.1 %      RDW-SD 44.2 fl      MPV 11.4 fL      Platelets 170 10*3/mm3      Neutrophil % 56.1 %      Lymphocyte % 35.8 %      Monocyte % 5.7 %      Eosinophil % 1.6 %      Basophil % 0.5 %      Immature Grans % 0.3 %      Neutrophils, Absolute 3.47 10*3/mm3      Lymphocytes, Absolute 2.21 10*3/mm3      Monocytes, Absolute 0.35 10*3/mm3      Eosinophils, Absolute 0.10 10*3/mm3      Basophils, Absolute 0.03 10*3/mm3      Immature Grans, Absolute 0.02 10*3/mm3      nRBC 0.0 /100 WBC     Ethanol [[id number]] Collected: 11/17/20 1818    Specimen: Blood Updated: 11/17/20 1856     Ethanol <10 mg/dL      Ethanol % <0.010 %     Urinalysis With Microscopic If Indicated (No Culture) - Urine, Clean Catch [[id number]]  (Abnormal) Collected: 11/17/20 2013    Specimen: Urine, Clean Catch Updated: 11/17/20 2032     Color, UA Dark Yellow     Appearance, UA Clear     pH, UA <=5.0     Specific Gravity, UA 1.025     Glucose, UA Negative     Ketones, UA Trace     Bilirubin, UA Negative     Blood, UA Trace     Protein, UA Negative     Leuk Esterase, UA Negative     Nitrite, UA Negative     Urobilinogen, UA 1.0 E.U./dL    Urine Drug Screen - Urine, Clean Catch [[id number]]  (Abnormal) Collected: 11/17/20 2013    Specimen: Urine,  Clean Catch Updated: 11/17/20 2049     Amphet/Methamphet, Screen Negative     Barbiturates Screen, Urine Negative     Benzodiazepine Screen, Urine Positive     Cocaine Screen, Urine Negative     Opiate Screen Positive     THC, Screen, Urine Negative     Methadone Screen, Urine Negative     Oxycodone Screen, Urine Negative    Narrative:      Negative Thresholds For Drugs Screened:     Amphetamines               500 ng/ml   Barbiturates               200 ng/ml   Benzodiazepines            100 ng/ml   Cocaine                    300 ng/ml   Methadone                  300 ng/ml   Opiates                    300 ng/ml   Oxycodone                  100 ng/ml   THC                        50 ng/ml    The Normal Value for all drugs tested is negative. This report includes final unconfirmed screening results to be used for medical treatment purposes only. Unconfirmed results must not be used for non-medical purposes such as employment or legal testing. Clinical consideration should be applied to any drug of abuse test, particulary when unconfirmed results are used.    Urinalysis, Microscopic Only - Urine, Clean Catch [[id number]]  (Abnormal) Collected: 11/17/20 2013    Specimen: Urine, Clean Catch Updated: 11/17/20 2048     RBC, UA None Seen /HPF      WBC, UA None Seen /HPF      Bacteria, UA Trace /HPF      Squamous Epithelial Cells, UA 0-2 /HPF      Hyaline Casts, UA None Seen /LPF      Mucus, UA Small/1+ /HPF      Methodology Manual Light Microscopy    POC Glucose Once [[id number]]  (Normal) Collected: 11/17/20 2032    Specimen: Blood Updated: 11/17/20 2034     Glucose 91 mg/dL     COVID PRE-OP / PRE-PROCEDURE SCREENING ORDER (NO ISOLATION) - Swab, Nasopharynx [[id number]]  (Normal) Collected: 11/17/20 2044    Specimen: Swab from Nasopharynx Updated: 11/17/20 2155    Narrative:      The following orders were created for panel order COVID PRE-OP / PRE-PROCEDURE SCREENING ORDER (NO ISOLATION) - Swab, Nasopharynx.  Procedure                                Abnormality         Status                     ---------                               -----------         ------                     Respiratory Panel PCR w/...[[id number]]  Normal              Final result                 Please view results for these tests on the individual orders.    Respiratory Panel PCR w/COVID-19(SARS-CoV-2) CHANO/FAIZAN/GIAN/PAD/COR/MAD/MIRELA In-House, NP Swab in UTM/VTM, 3-4 HR TAT - Swab, Nasopharynx [[id number]]  (Normal) Collected: 11/17/20 2044    Specimen: Swab from Nasopharynx Updated: 11/17/20 2155     ADENOVIRUS, PCR Not Detected     Coronavirus 229E Not Detected     Coronavirus HKU1 Not Detected     Coronavirus NL63 Not Detected     Coronavirus OC43 Not Detected     COVID19 Not Detected     Human Metapneumovirus Not Detected     Human Rhinovirus/Enterovirus Not Detected     Influenza A PCR Not Detected     Influenza B PCR Not Detected     Parainfluenza Virus 1 Not Detected     Parainfluenza Virus 2 Not Detected     Parainfluenza Virus 3 Not Detected     Parainfluenza Virus 4 Not Detected     RSV, PCR Not Detected     Bordetella pertussis pcr Not Detected     Bordetella parapertussis PCR Not Detected     Chlamydophila pneumoniae PCR Not Detected     Mycoplasma pneumo by PCR Not Detected    Narrative:      Fact sheet for providers: https://docs.K[employer]/wp-content/uploads/TZU7160-4458-OF0.1-EUA-Provider-Fact-Sheet-3.pdf    Fact sheet for patients: https://docs.[employer]/wp-content/uploads/BJQ0270-5982-IA9.1-EUA-Patient-Fact-Sheet-1.pdf    POC Glucose Once [[id number]]  (Normal) Collected: 11/17/20 2331    Specimen: Blood Updated: 11/17/20 2332     Glucose 94 mg/dL           Imaging Results (Last 24 Hours)     Procedure Component Value Units Date/Time    CT Head Without Contrast [[id number]] Collected: 11/17/20 2001     Updated: 11/17/20 2020    Narrative:      CT HEAD WITHOUT CONTRAST     HISTORY: Confusion     TECHNIQUE:  Head CT includes axial imaging from the  base of skull to the  vertex without intravenous contrast. Radiation dose reduction techniques  were utilized, including automated exposure control and exposure  modulation based on body size.     COMPARISON:None     FINDINGS: There are no abnormal areas of increased attenuation  intra-axially to suggest hemorrhage. No extra-axial fluid collection is  observed. There is no evidence for cerebral edema, mass effect or shift  of the midline structures. Bone windows demonstrate no calvarial  abnormality. Mastoid air cells and visualized paranasal sinuses appear  clear.        Impression:      No evidence for acute intracranial abnormality.     This report was finalized on 11/17/2020 8:16 PM by Dr. Truman Kelly M.D.       XR Chest 1 View [[id number]] Collected: 11/17/20 1741     Updated: 11/17/20 1746    Narrative:      XR CHEST 1 VW-     HISTORY: Female who is 65 years-old, weakness     TECHNIQUE: Frontal view of the chest     COMPARISON: None available     FINDINGS: The heart size is normal. Aorta is calcified. Slight  prominence of vascular and interstitial markings. No focal pulmonary  consolidation, pleural effusion, or pneumothorax. Right hemidiaphragm is  elevated. No acute osseous process.       Impression:      No focal pulmonary consolidation. Follow-up as clinical  indications persist.     This report was finalized on 11/17/2020 5:42 PM by Dr. Emanuel Steiner M.D.                  ECG 12 Lead   Final Result   HEART RATE= 95  bpm   RR Interval= 632  ms   MT Interval= 155  ms   P Horizontal Axis= 24  deg   P Front Axis= 51  deg   QRSD Interval= 101  ms   QT Interval= 366  ms   QRS Axis= -55  deg   T Wave Axis= 83  deg   - ABNORMAL ECG -   Sinus rhythm   Left anterior fascicular block   Left ventricular hypertrophy   Borderline T abnormalities, anterior leads   NO PRIOR TRACING AVAILABLE FOR COMPARISON   Electronically Signed By: Jose Wang ([facility]) 17-Nov-2020 17:06:10   Date and Time of Study:  2020-11-17 16:02:49           Assessment/Plan     Active Hospital Problems    Diagnosis  POA   • **Altered mental status [R41.82]  Yes   • COPD (chronic obstructive pulmonary disease) (CMS/[facility]) [J44.9]  Yes   • Schizoaffective disorder, bipolar type (CMS/[facility]) [F25.0]  Yes   • Essential hypertension [I10]  Yes   • Acquired hypothyroidism [E03.9]  Yes   • History of pulmonary embolus (PE) [Z86.711]  Yes      Resolved Hospital Problems   No resolved problems to display.       Ms. Lopez is a 65 y.o. female with a history of COPD, CAD, HTN, depression/anxiety, bipolar/schizoaffective disorder, hypothyroidism who is admitted for AMS    AMS/Hx schizoaffective disorder/bipolar:   -No evidence of infection; CTH negative. ? Medication effects vs underlying psychiatric condition  -Access has seen; Dr. Irvin consulted. Previous [facility] admission 2011. [facility]  -Will hold Ambien & hydrocodone; continue Seroquel, Remeron, PRN klonopin for now  -Neurology also consulted to evaluate    COPD:  -Not in acute exacerbation  -Continue inhalers, O2  -CXR negative    HTN:  -Continue home meds  -BP controlled    Hypothyroidism:  -Check TSH, FT4 (3.440, 0.58 3/2020)  -liothyronine    Nursing to verify home meds as [facility] summary reports pt takes Eliquis for hx DVT/PE  Start lovenox for ppx for now    PT eval & tx    · I discussed the patient's findings and my recommendations with patient, nursing staff and Dr. Mijares.    VTE Prophylaxis - Pharmacy to dose Lovenox.  Code Status - Full code.       EMELY Mcadams  [facility]  11/18/20  11:51 EST

## 2020-11-18 NOTE — PLAN OF CARE
Goal Outcome Evaluation:  Plan of Care Reviewed With: patient  Progress: no change  Outcome Summary: pt admitted for AMS. EMS called by family after weeks of confusion post admission in October. Daughter concerned pt may be overmedcating contributing to sx. CT head negative. Pt mental status at baseline, A&Ox4. VSS, incontinent. Medications held secondary to pt somnolence. Passed bedside swallow. Neurology consulted. Will continue to monitor and await further orders.

## 2020-11-18 NOTE — NURSING NOTE
Daughter does not want patient to talk to her grandmother (Patients mother) due to their unhealthy co-dependent relationship.  This is apparently a pattern.  If at all possible staff could say patient is asleep when the grandmother calls.

## 2020-11-18 NOTE — CONSULTS
"Pt is a 64 yo female seen in ED for AMS.  She is single, reports she lives with her daughter and is retired.  Pt is alert and oriented to self and place only.  Thinks the year is , unsure of president, thinks she is 64 yo.  She is pleasant, affect and mood are appropriate and she is cooperative with interview.  Pt reports no concerns.  She reports that she has no hx of psychiatric issues, does not take any psych meds.  She denies any psych hospitalizations and does not have substance/alcohol abuse issues.  She reports she was just in the hospital recently, cannot remember which one, and was \"not feeling too good\".  She cannot clarify exactly what her symptoms were at that time. Pt denies SI, HI and hallucinations. Pt give permission to call her daughter, Lambert Lopez at [phone].  Spoke with pt's daughter at length.  She reports that pt has a hx of bipolar/schizoaffective d/o and sees psychiatry at [facility].  She is unsure which MD, because they \"switch a lot\".  Pt is current with the practice and daughter notified them that pt was in hospital.  Pt is currently prescribed Seroquel 600mg nightly, Konopin 1mg BID PRN, and Remeron 45mg nightly. She also takes Ambien 10mg nightly for sleep.  Daughter states that pt has been doing \"just fine\" on this regimen for several years, though psychiatry had suggested lowering doses recently.  Daughter reports that pt was last hospitalized on [facility] in  for about a week.  This happened after pt's  , and pt started experiencing depression then hallucinations.  Daughter states that pt has been doing well since then but has been under \"a lot of pressure caring for her mother\".  Pt has been living with her mother and caring for her since October after a fall.  Pt has not been sleeping well r/t to getting up with her mother at night.  Daughter is worried because pt is taking all her night time meds but isn't able to \"sleep them off\" and has been getting more " "confused.  Daughter states pt has \"not been right since being out of hospital\".  Pt was hospitalized in October at [place] for COPD.  Daughter states she has noticed pt being more forgetful/memory issues for the last 6 months.  Daughter states pt has taken on too much and \"I'm afraid it's causing a nervous breakdown\".  Dr Irvin has been consulted.  Access center will follow.     "

## 2020-11-18 NOTE — PAYOR COMM NOTE
"Sarah Lopez (65 y.o. Female)     ATT: REQUESTING AUTHORIZATION FOR OBSERVATION SERVICES; TESTING AND TREATMENT                                                         #[id number]    PLEASE REPLY TO [facility] DEPT: ANNAMARIA HEBERT RN/CCP; [phone],  [phone]           Date of Birth Social Security Number Address Home Phone MRN    1955  [id number]    [facility] [address]  [id number]    Christian Marital Status          None Single       Admission Date Admission Type Admitting Provider Attending Provider Department, Room/Bed    11/17/20 Emergency EdlingMichael MD Nguyen, Minh Loc, MD [facility] Emergency Department, 51/51    Discharge Date Discharge Disposition Discharge Destination                       Attending Provider: Melquiades Mijares MD    Allergies: No Known Allergies    Isolation: None   Infection: None   Code Status: CPR    Ht: 167.6 cm (66\")   Wt: 106 kg (233 lb 3.2 oz)    Admission Cmt: None   Principal Problem: Altered mental status [R41.82]                 Active Insurance as of 11/17/2020     Primary Coverage     Payor Plan Insurance Group Employer/Plan Group    [employer] [place] WELL[employer]ARE MEDICARE REPLACEMENT      Payor Plan Address Payor Plan Phone Number Payor Plan Fax Number Effective Dates    [address] [address] [phone]  11/1/2020 - None Entered    [facility] [id number]       Subscriber Name Subscriber Birth Date Member ID       SARAH LOPEZ 1955 [id number]           Secondary Coverage     Payor Plan Insurance Group Employer/Plan Group    [place]      Payor Plan Address Payor Plan Phone Number Payor Plan Fax Number Effective Dates    [address] [phone]  11/17/2020 - None Entered    [place] [address]       Subscriber Name Subscriber Birth Date Member ID       SARAH LOPEZ 1955 [id number]                 Emergency Contacts      (Rel.) Home Phone Work Phone Mobile Phone    " EVONNE LOPEZ (Daughter) -- -- [phone]               History & Physical      Melani Malone, APRN at 11/18/20 1110              Patient Name:  Sarah Lopez  YOB: 1955  MRN:  [id number]  Admit Date:  11/17/2020  Patient Care Team:  Dixon Miller MD as PCP - General (Internal Medicine)      Subjective   History Present Illness     Chief Complaint   Patient presents with   • Altered Mental Status   • Weakness - Generalized       Ms. Lopez is a 65 y.o. female with a history of COPD, CAD, HTN, depression/anxiety, bipolar/schizoaffective disorder, hypothyroidism that presents to [facility] complaining of family concern of AMS. Pt is not a good historian, most information taken from ED/Psychiatry notes. According to daughter report, pt has diagnosis of bipolar/schizoaffective disorder, followed by [facility] psychiatry. Pt has had prior [facility] admission in [facility] following death of . She has apparently been managed well until recently when she had a hospitalization in October at [facility] due to COPD. Daughter has noticed more memory issues/forgetfulness for the past 6 months. Pt has been taking of her mother since October when she suffered a fall. Apparently pt has not been sleeping well despite use of Remeron, Seroquel, Ativan, and Ambien. No report of recent illnesses such as fever, increased shortness of breath, dysuria. On exam, pt was alert, oriented to month, year, place; she reported she was admitted because she had fallen although is not reported in records. No specific pain reported. She was able to state that she takes care of her mother. She reports having decreased appetite. She did seem distracted, slow to verbally respond at times.     Afebrile. Not hypotensive. Sats stable on O2 2L NC. RVP/Covid 19 negative. UA negative for infection. UDS + benzo/opiate. Electrolytes/renal function stable. Trop neg. Hgb 16, WBC 6. Ethanol < 10. CXR no focal consolidation.  CTH negative.     Review of Systems   Constitutional: Positive for appetite change. Negative for fever.   HENT: Negative for congestion.    Respiratory: Negative for cough and shortness of breath.    Cardiovascular: Negative.    Gastrointestinal: Negative.    Genitourinary: Negative for dysuria.   Musculoskeletal: Negative.    Skin: Negative.    Neurological: Negative.    Psychiatric/Behavioral: Positive for confusion, decreased concentration and sleep disturbance.        Personal History     Past Medical History:   Diagnosis Date   • Anxiety    • COPD (chronic obstructive pulmonary disease) (CMS/HCC)    • Coronary artery disease    • Depression    • Hypertension      Past Surgical History:   Procedure Laterality Date   • HYSTERECTOMY       History reviewed. No pertinent family history.  Social History     Tobacco Use   • Smoking status: Former Smoker     Quit date: 10/23/2020     Years since quittin.0   Substance Use Topics   • Alcohol use: Not Currently   • Drug use: Not Currently     No current facility-administered medications on file prior to encounter.      Current Outpatient Medications on File Prior to Encounter   Medication Sig Dispense Refill   • aspirin (aspirin) 81 MG EC tablet Take 81 mg by mouth Daily.     • clonazePAM (KlonoPIN) 1 MG tablet Take 1 mg by mouth 2 (Two) Times a Day As Needed.     • HYDROcodone-acetaminophen (NORCO) 7.5-325 MG per tablet Take 1 tablet by mouth Every 8 (Eight) Hours As Needed for Moderate Pain .     • isosorbide mononitrate (IMDUR) 30 MG 24 hr tablet Take 30 mg by mouth Daily.     • liothyronine (CYTOMEL) 25 MCG tablet Take 1 tablet by mouth Daily.     • lisinopril (PRINIVIL,ZESTRIL) 10 MG tablet Take 10 mg by mouth Daily.     • metoprolol succinate XL (TOPROL-XL) 50 MG 24 hr tablet Take 50 mg by mouth Daily.     • mirtazapine (REMERON) 45 MG tablet Take 1 tablet by mouth every night at bedtime.     • nicotine (NICODERM CQ) 21 MG/24HR patch Place 1 patch on the skin as  directed by provider Daily.     • potassium chloride (K-DUR,KLOR-CON) 20 MEQ CR tablet Take 20 mEq by mouth 3 (Three) Times a Day.     • QUEtiapine (SEROquel) 300 MG tablet Take 600 mg by mouth Every Night.     • sennosides-docusate (Senexon-S) 8.6-50 MG per tablet Take 1 tablet by mouth Daily.     • zolpidem (Ambien) 10 MG tablet Take 10 mg by mouth At Night As Needed for Sleep.       No Known Allergies    Objective    Objective     Vital Signs  Temp:  [97.3 °F (36.3 °C)-98.4 °F (36.9 °C)] 98.2 °F (36.8 °C)  Heart Rate:  [85-96] 85  Resp:  [16-18] 16  BP: (134-150)/(87-92) 150/91  SpO2:  [91 %-98 %] 95 %  on  Flow (L/min):  [2] 2;   Device (Oxygen Therapy): nasal cannula  Body mass index is 37.64 kg/m².    Physical Exam  Vitals signs and nursing note reviewed.   Constitutional:       General: She is not in acute distress.     Appearance: She is obese.   HENT:      Head: Normocephalic.   Eyes:      Conjunctiva/sclera: Conjunctivae normal.   Neck:      Musculoskeletal: Normal range of motion and neck supple.   Cardiovascular:      Rate and Rhythm: Normal rate and regular rhythm.   Pulmonary:      Effort: Pulmonary effort is normal. No respiratory distress.      Breath sounds: Normal breath sounds.   Abdominal:      General: Bowel sounds are normal.      Palpations: Abdomen is soft.   Musculoskeletal:      Right lower leg: No edema.      Left lower leg: No edema.   Skin:     General: Skin is warm and dry.   Neurological:      General: No focal deficit present.      Mental Status: She is alert.   Psychiatric:         Mood and Affect: Mood normal.         Speech: Speech normal.         Behavior: Behavior is slowed.      Comments: Decreased attention; distracted at times         Results Review:  I reviewed the patient's new clinical results.  I reviewed the patient's new imaging results and agree with the interpretation.  I reviewed the patient's other test results and agree with the interpretation  I personally viewed  and interpreted the patient's EKG/Telemetry data    Lab Results (last 24 hours)     Procedure Component Value Units Date/Time    Blood Gas, Arterial - [[id number]]  (Abnormal) Collected: 11/17/20 1814    Specimen: Arterial Blood Updated: 11/17/20 1817     Site Arterial: left radial     Augusto's Test Positive     pH, Arterial 7.392 pH units      pCO2, Arterial 42.5 mm Hg      pO2, Arterial 69.7 mm Hg      HCO3, Arterial 25.9 mmol/L      Base Excess, Arterial 0.6 mmol/L      O2 Saturation Calculated 93.5 %      A-a Gradiant 0.6 mmHg      Barometric Pressure for Blood Gas 764.6 mmHg      Modality Room Air     FIO2 21 %      Rate 16 Breaths/minute     CBC & Differential [[id number]]  (Abnormal) Collected: 11/17/20 1818    Specimen: Blood Updated: 11/17/20 1835    Narrative:      The following orders were created for panel order CBC & Differential.  Procedure                               Abnormality         Status                     ---------                               -----------         ------                     CBC Auto Differential[[id number]]        Abnormal            Final result                 Please view results for these tests on the individual orders.    Comprehensive Metabolic Panel [[id number]]  (Abnormal) Collected: 11/17/20 1818    Specimen: Blood Updated: 11/17/20 1856     Glucose 97 mg/dL      BUN 7 mg/dL      Creatinine 0.89 mg/dL      Sodium 138 mmol/L      Potassium 4.1 mmol/L      Chloride 105 mmol/L      CO2 24.3 mmol/L      Calcium 9.4 mg/dL      Total Protein 6.8 g/dL      Albumin 3.50 g/dL      ALT (SGPT) 15 U/L      AST (SGOT) 24 U/L      Alkaline Phosphatase 90 U/L      Total Bilirubin 0.6 mg/dL      eGFR   Amer 77 mL/min/1.73      Globulin 3.3 gm/dL      A/G Ratio 1.1 g/dL      BUN/Creatinine Ratio 7.9     Anion Gap 8.7 mmol/L     Narrative:      GFR Normal >60  Chronic Kidney Disease <60  Kidney Failure <15      Troponin [[id number]]  (Normal) Collected: 11/17/20 1818    Specimen:  Blood Updated: 11/17/20 1856     Troponin T <0.010 ng/mL     Narrative:      Troponin T Reference Range:  <= 0.03 ng/mL-   Negative for AMI  >0.03 ng/mL-     Abnormal for myocardial necrosis.  Clinicians would have to utilize clinical acumen, EKG, Troponin and serial changes to determine if it is an Acute Myocardial Infarction or myocardial injury due to an underlying chronic condition.       Results may be falsely decreased if patient taking Biotin.      Magnesium [[id number]]  (Normal) Collected: 11/17/20 1818    Specimen: Blood Updated: 11/17/20 1856     Magnesium 2.0 mg/dL     CBC Auto Differential [[id number]]  (Abnormal) Collected: 11/17/20 1818    Specimen: Blood Updated: 11/17/20 1835     WBC 6.18 10*3/mm3      RBC 5.20 10*6/mm3      Hemoglobin 16.2 g/dL      Hematocrit 48.6 %      MCV 93.5 fL      MCH 31.2 pg      MCHC 33.3 g/dL      RDW 13.1 %      RDW-SD 44.2 fl      MPV 11.4 fL      Platelets 170 10*3/mm3      Neutrophil % 56.1 %      Lymphocyte % 35.8 %      Monocyte % 5.7 %      Eosinophil % 1.6 %      Basophil % 0.5 %      Immature Grans % 0.3 %      Neutrophils, Absolute 3.47 10*3/mm3      Lymphocytes, Absolute 2.21 10*3/mm3      Monocytes, Absolute 0.35 10*3/mm3      Eosinophils, Absolute 0.10 10*3/mm3      Basophils, Absolute 0.03 10*3/mm3      Immature Grans, Absolute 0.02 10*3/mm3      nRBC 0.0 /100 WBC     Ethanol [[id number]] Collected: 11/17/20 1818    Specimen: Blood Updated: 11/17/20 1856     Ethanol <10 mg/dL      Ethanol % <0.010 %     Urinalysis With Microscopic If Indicated (No Culture) - Urine, Clean Catch [[id number]]  (Abnormal) Collected: 11/17/20 2013    Specimen: Urine, Clean Catch Updated: 11/17/20 2032     Color, UA Dark Yellow     Appearance, UA Clear     pH, UA <=5.0     Specific Gravity, UA 1.025     Glucose, UA Negative     Ketones, UA Trace     Bilirubin, UA Negative     Blood, UA Trace     Protein, UA Negative     Leuk Esterase, UA Negative     Nitrite, UA Negative      Urobilinogen, UA 1.0 E.U./dL    Urine Drug Screen - Urine, Clean Catch [[id number]]  (Abnormal) Collected: 11/17/20 2013    Specimen: Urine, Clean Catch Updated: 11/17/20 2049     Amphet/Methamphet, Screen Negative     Barbiturates Screen, Urine Negative     Benzodiazepine Screen, Urine Positive     Cocaine Screen, Urine Negative     Opiate Screen Positive     THC, Screen, Urine Negative     Methadone Screen, Urine Negative     Oxycodone Screen, Urine Negative    Narrative:      Negative Thresholds For Drugs Screened:     Amphetamines               500 ng/ml   Barbiturates               200 ng/ml   Benzodiazepines            100 ng/ml   Cocaine                    300 ng/ml   Methadone                  300 ng/ml   Opiates                    300 ng/ml   Oxycodone                  100 ng/ml   THC                        50 ng/ml    The Normal Value for all drugs tested is negative. This report includes final unconfirmed screening results to be used for medical treatment purposes only. Unconfirmed results must not be used for non-medical purposes such as employment or legal testing. Clinical consideration should be applied to any drug of abuse test, particulary when unconfirmed results are used.    Urinalysis, Microscopic Only - Urine, Clean Catch [[id number]]  (Abnormal) Collected: 11/17/20 2013    Specimen: Urine, Clean Catch Updated: 11/17/20 2048     RBC, UA None Seen /HPF      WBC, UA None Seen /HPF      Bacteria, UA Trace /HPF      Squamous Epithelial Cells, UA 0-2 /HPF      Hyaline Casts, UA None Seen /LPF      Mucus, UA Small/1+ /HPF      Methodology Manual Light Microscopy    POC Glucose Once [[id number]]  (Normal) Collected: 11/17/20 2032    Specimen: Blood Updated: 11/17/20 2034     Glucose 91 mg/dL     COVID PRE-OP / PRE-PROCEDURE SCREENING ORDER (NO ISOLATION) - Swab, Nasopharynx [[id number]]  (Normal) Collected: 11/17/20 2044    Specimen: Swab from Nasopharynx Updated: 11/17/20 2155    Narrative:      The  following orders were created for panel order COVID PRE-OP / PRE-PROCEDURE SCREENING ORDER (NO ISOLATION) - Swab, Nasopharynx.  Procedure                               Abnormality         Status                     ---------                               -----------         ------                     Respiratory Panel PCR w/...[[id number]]  Normal              Final result                 Please view results for these tests on the individual orders.    Respiratory Panel PCR w/COVID-19(SARS-CoV-2) CHANO/FAIZAN/GIAN/PAD/COR/MAD/MIRELA In-House, NP Swab in UTM/VTM, 3-4 HR TAT - Swab, Nasopharynx [[id number]]  (Normal) Collected: 11/17/20 2044    Specimen: Swab from Nasopharynx Updated: 11/17/20 2155     ADENOVIRUS, PCR Not Detected     Coronavirus 229E Not Detected     Coronavirus HKU1 Not Detected     Coronavirus NL63 Not Detected     Coronavirus OC43 Not Detected     COVID19 Not Detected     Human Metapneumovirus Not Detected     Human Rhinovirus/Enterovirus Not Detected     Influenza A PCR Not Detected     Influenza B PCR Not Detected     Parainfluenza Virus 1 Not Detected     Parainfluenza Virus 2 Not Detected     Parainfluenza Virus 3 Not Detected     Parainfluenza Virus 4 Not Detected     RSV, PCR Not Detected     Bordetella pertussis pcr Not Detected     Bordetella parapertussis PCR Not Detected     Chlamydophila pneumoniae PCR Not Detected     Mycoplasma pneumo by PCR Not Detected    Narrative:      Fact sheet for providers: https://docs.Prof[employer]/wp-content/uploads/DWD7308-9370-EZ0.1-EUA-Provider-Fact-Sheet-3.pdf    Fact sheet for patients: https://docs.[employer]/wp-content/uploads/GKG6326-2201-XK1.1-EUA-Patient-Fact-Sheet-1.pdf    POC Glucose Once [[id number]]  (Normal) Collected: 11/17/20 2331    Specimen: Blood Updated: 11/17/20 2332     Glucose 94 mg/dL           Imaging Results (Last 24 Hours)     Procedure Component Value Units Date/Time    CT Head Without Contrast [[id number]] Collected: 11/17/20 2001      Updated: 11/17/20 2020    Narrative:      CT HEAD WITHOUT CONTRAST     HISTORY: Confusion     TECHNIQUE:  Head CT includes axial imaging from the base of skull to the  vertex without intravenous contrast. Radiation dose reduction techniques  were utilized, including automated exposure control and exposure  modulation based on body size.     COMPARISON:None     FINDINGS: There are no abnormal areas of increased attenuation  intra-axially to suggest hemorrhage. No extra-axial fluid collection is  observed. There is no evidence for cerebral edema, mass effect or shift  of the midline structures. Bone windows demonstrate no calvarial  abnormality. Mastoid air cells and visualized paranasal sinuses appear  clear.        Impression:      No evidence for acute intracranial abnormality.     This report was finalized on 11/17/2020 8:16 PM by Dr. Truman Klely M.D.       XR Chest 1 View [[id number]] Collected: 11/17/20 1741     Updated: 11/17/20 1746    Narrative:      XR CHEST 1 VW-     HISTORY: Female who is 65 years-old, weakness     TECHNIQUE: Frontal view of the chest     COMPARISON: None available     FINDINGS: The heart size is normal. Aorta is calcified. Slight  prominence of vascular and interstitial markings. No focal pulmonary  consolidation, pleural effusion, or pneumothorax. Right hemidiaphragm is  elevated. No acute osseous process.       Impression:      No focal pulmonary consolidation. Follow-up as clinical  indications persist.     This report was finalized on 11/17/2020 5:42 PM by Dr. Emanuel Steiner M.D.                  ECG 12 Lead   Final Result   HEART RATE= 95  bpm   RR Interval= 632  ms   VT Interval= 155  ms   P Horizontal Axis= 24  deg   P Front Axis= 51  deg   QRSD Interval= 101  ms   QT Interval= 366  ms   QRS Axis= -55  deg   T Wave Axis= 83  deg   - ABNORMAL ECG -   Sinus rhythm   Left anterior fascicular block   Left ventricular hypertrophy   Borderline T abnormalities, anterior leads    NO PRIOR TRACING AVAILABLE FOR COMPARISON   Electronically Signed By: Jose Wang ([facility]) 17-Nov-2020 17:06:10   Date and Time of Study: 2020-11-17 16:02:49           Assessment/Plan     Active Hospital Problems    Diagnosis  POA   • **Altered mental status [R41.82]  Yes   • COPD (chronic obstructive pulmonary disease) (CMS/HCC) [J44.9]  Yes   • Schizoaffective disorder, bipolar type (CMS/HCC) [F25.0]  Yes   • Essential hypertension [I10]  Yes   • Acquired hypothyroidism [E03.9]  Yes   • History of pulmonary embolus (PE) [Z86.711]  Yes      Resolved Hospital Problems   No resolved problems to display.       Ms. Lopez is a 65 y.o. female with a history of COPD, CAD, HTN, depression/anxiety, bipolar/schizoaffective disorder, hypothyroidism who is admitted for AMS    AMS/Hx schizoaffective disorder/bipolar:   -No evidence of infection; CTH negative. ? Medication effects vs underlying psychiatric condition  -Access has seen; Dr. Irvin consulted. Previous [facility] admission 2011. Follow[facility] by [facility] psychiatry outpatient  -Will hold Ambien & hydrocodone; continue Seroquel, Remeron, PRN klonopin for now  -Neurology also consulted to evaluate    COPD:  -Not in acute exacerbation  -Continue inhalers, O2  -CXR negative    HTN:  -Continue home meds  -BP controlled    Hypothyroidism:  -Check TSH, FT4 (3.440, 0.58 3/2020)  -liothyronine    Nursing to verify home meds as [facility] summary reports pt takes Eliquis for hx DVT/PE  Start lovenox for ppx for now    PT eval & tx    · I discussed the patient's findings and my recommendations with patient, nursing staff and Dr. Mijares.    VTE Prophylaxis - Pharmacy to dose Lovenox.  Code Status - Full code.       EMELY Mcadams  [facility]  11/18/20  11:51 EST      Electronically signed by Melani Malone APRN at 11/18/20 1151          Emergency Department Notes      Degonda, Janet, RN at 11/17/20 1434        Pt is confused since hospital stay  for 6 days a month ago.  Ems does not know where she was admitted or what her dx was.  She is unsteady on her feet and has had increased falls.  Is on 2L O2 at baseline    Patient was placed in face mask during first look triage.  Patient was wearing a face mask throughout encounter.  I wore personal protective equipment throughout the encounter.  Hand hygiene was performed before and after patient encounter.       Electronically signed by Degonda, Janet, RN at 11/17/20 1438     Nusrat Montano RN at 11/17/20 2027        Lambert Lopez [phone]     Nusrat Montano RN  11/17/20 2028      Electronically signed by Nusrat Montano RN at 11/17/20 2028       {Outbreak/Travel/Exposure Documentation......;  Question Available Choices Patient Response   Outbreak Screen: Do you currently have a new onset of the following symptoms?        Fever/Chils, Cough, Shortness of air, Loss of taste or smell, No, Unknown  No (11/17/20 2305)   Outbreak Screen: In the last 14 days, have you had contact with anyone who is ill, has show any of the symptoms listed above and/or has been diagnosis with the 2019 Novel Coronavirus? This includes any immediate household members but excludes any patients with whom you have been in contact within your normal work duties wearing proper PPE, if you are a healthcare worker.  Yes, No, Unknown              No (11/17/20 2305)   Outbreak Screen: Who was notified?    Free text  (not recorded)   Travel Screen: Have you traveled in the last month? If so, to what country have you traveled? If [place] what state? Yes, No, Unknown  List of all countries  List of all States No (11/17/20 2305)  (not recorded)  (not recorded)   Infection Risk: Do you currently have the following symptoms?  (If cough is selected, the Tuberculosis Screen is performed.) Cough, Fever, Rash, No No (11/17/20 2305)   Tuberculosis Screen: Do you have any of the following Tuberculosis Risks?  · Have you lived or spent time with  anyone who had or may have TB?  · Have you lived in or visited any of the following areas for more than one month: [place], [place], [place], [place] or [place], the [place] or [place]?  · Do you have HIV/AIDS?  · Have you lived in or worked in a nursing home, homeless shelter, correctional facility, or substance abuse treatment facility?   · No    If Yes do you have any of the following symptoms? Yes responses display to the right    If Yes, symptoms listed are:  Cough greater than or equal to 3 weeks, Loss of appetite, Unexplained weight loss, Night sweats, Bloody sputum or hemoptysis, Hoarseness, Fever, Fatigue, Chest pain, No (not recorded)  (not recorded)   Exposure Screen: Have you been exposed to any of these contagious diseases in the last month? Measles, Chickenpox, Meningitis, Pertussis, Whooping Cough, No No (11/17/20 2305)       Vital Signs (last day)     Date/Time   Temp   Temp src   Pulse   Resp   BP   Patient Position   SpO2    11/18/20 1358   98 (36.7)   Oral   --   16   126/84   Lying   92    11/18/20 0806   98.2 (36.8)   Oral   --   16   150/91   Lying   --    11/18/20 0300   97.3 (36.3)   Oral   85   18   137/89   Lying   95    11/17/20 2249   98.4 (36.9)   Oral   92   18   139/89   Sitting   92    11/17/20 20:13:59   --   --   92   16   146/92   --   91    11/17/20 20:02:22   --   --   93   18   --   Lying   --    11/17/20 1436   98.2 (36.8)   Tympanic   96   16   134/87   --   98              Oxygen Therapy (last day)     Date/Time   SpO2   Device (Oxygen Therapy)   Flow (L/min)   Oxygen Concentration (%)   ETCO2 (mmHg)    11/18/20 1358   92   nasal cannula   2   --   --    11/18/20 0806   --   nasal cannula   2   --   --    11/18/20 0300   95   nasal cannula   2   --   --    11/18/20 0200   --   nasal cannula   2   --   --    11/17/20 2249   92   nasal cannula   2   --   --    11/17/20 20:13:59   91   room air   --   --   --    11/17/20 1436   98   nasal cannula   2   --   --               Intake & Output (last day)       11/17 0701 - 11/18 0700 11/18 0701 - 11/19 0700    P.O.  0    Total Intake(mL/kg)  0 (0)    Net  0          Urine Unmeasured Occurrence 1 x 2 x    Stool Unmeasured Occurrence  2 x        Lines, Drains & Airways    Active LDAs     Name:   Placement date:   Placement time:   Site:   Days:    Peripheral IV 11/17/20 1818 Right Antecubital   11/17/20 1818    Antecubital   less than 1    External Urinary Catheter   11/17/20 2230    --   less than 1         Inactive LDAs     None                  Lab Results (last 24 hours)     Procedure Component Value Units Date/Time    POC Glucose Once [[id number]]  (Normal) Collected: 11/17/20 2331    Specimen: Blood Updated: 11/17/20 2332     Glucose 94 mg/dL     COVID PRE-OP / PRE-PROCEDURE SCREENING ORDER (NO ISOLATION) - Swab, Nasopharynx [[id number]]  (Normal) Collected: 11/17/20 2044    Specimen: Swab from Nasopharynx Updated: 11/17/20 2155    Narrative:      The following orders were created for panel order COVID PRE-OP / PRE-PROCEDURE SCREENING ORDER (NO ISOLATION) - Swab, Nasopharynx.  Procedure                               Abnormality         Status                     ---------                               -----------         ------                     Respiratory Panel PCR w/...[[id number]]  Normal              Final result                 Please view results for these tests on the individual orders.    Respiratory Panel PCR w/COVID-19(SARS-CoV-2) [facility]/GIAN/[facility]/MAD/MIRELA In-House, NP Swab in UTM/VTM, 3-4 HR TAT - Swab, Nasopharynx [[id number]]  (Normal) Collected: 11/17/20 2044    Specimen: Swab from Nasopharynx Updated: 11/17/20 2155     ADENOVIRUS, PCR Not Detected     Coronavirus 229E Not Detected     Coronavirus HKU1 Not Detected     Coronavirus NL63 Not Detected     Coronavirus OC43 Not Detected     COVID19 Not Detected     Human Metapneumovirus Not Detected     Human Rhinovirus/Enterovirus Not Detected     Influenza A PCR  Not Detected     Influenza B PCR Not Detected     Parainfluenza Virus 1 Not Detected     Parainfluenza Virus 2 Not Detected     Parainfluenza Virus 3 Not Detected     Parainfluenza Virus 4 Not Detected     RSV, PCR Not Detected     Bordetella pertussis pcr Not Detected     Bordetella parapertussis PCR Not Detected     Chlamydophila pneumoniae PCR Not Detected     Mycoplasma pneumo by PCR Not Detected    Narrative:      Fact sheet for providers: https://docs.[employer]/wp-content/uploads/JQE5153-8866-LE4.1-EUA-Provider-Fact-Sheet-3.pdf    Fact sheet for patients: https://docs.[employer]/wp-content/uploads/SKA9296-3492-NY0.1-EUA-Patient-Fact-Sheet-1.pdf    Urine Drug Screen - Urine, Clean Catch [[id number]]  (Abnormal) Collected: 11/17/20 2013    Specimen: Urine, Clean Catch Updated: 11/17/20 2049     Amphet/Methamphet, Screen Negative     Barbiturates Screen, Urine Negative     Benzodiazepine Screen, Urine Positive     Cocaine Screen, Urine Negative     Opiate Screen Positive     THC, Screen, Urine Negative     Methadone Screen, Urine Negative     Oxycodone Screen, Urine Negative    Narrative:      Negative Thresholds For Drugs Screened:     Amphetamines               500 ng/ml   Barbiturates               200 ng/ml   Benzodiazepines            100 ng/ml   Cocaine                    300 ng/ml   Methadone                  300 ng/ml   Opiates                    300 ng/ml   Oxycodone                  100 ng/ml   THC                        50 ng/ml    The Normal Value for all drugs tested is negative. This report includes final unconfirmed screening results to be used for medical treatment purposes only. Unconfirmed results must not be used for non-medical purposes such as employment or legal testing. Clinical consideration should be applied to any drug of abuse test, particulary when unconfirmed results are used.    Urinalysis, Microscopic Only - Urine, Clean Catch [[id number]]  (Abnormal) Collected: 11/17/20 2013     Specimen: Urine, Clean Catch Updated: 11/17/20 2048     RBC, UA None Seen /HPF      WBC, UA None Seen /HPF      Bacteria, UA Trace /HPF      Squamous Epithelial Cells, UA 0-2 /HPF      Hyaline Casts, UA None Seen /LPF      Mucus, UA Small/1+ /HPF      Methodology Manual Light Microscopy    POC Glucose Once [[id number]]  (Normal) Collected: 11/17/20 2032    Specimen: Blood Updated: 11/17/20 2034     Glucose 91 mg/dL     Urinalysis With Microscopic If Indicated (No Culture) - Urine, Clean Catch [[id number]]  (Abnormal) Collected: 11/17/20 2013    Specimen: Urine, Clean Catch Updated: 11/17/20 2032     Color, UA Dark Yellow     Appearance, UA Clear     pH, UA <=5.0     Specific Gravity, UA 1.025     Glucose, UA Negative     Ketones, UA Trace     Bilirubin, UA Negative     Blood, UA Trace     Protein, UA Negative     Leuk Esterase, UA Negative     Nitrite, UA Negative     Urobilinogen, UA 1.0 E.U./dL    [place] Draw [[id number]] Collected: 11/17/20 1818    Specimen: Blood Updated: 11/17/20 1931    Narrative:      The following orders were created for panel order [place] Draw.  Procedure                               Abnormality         Status                     ---------                               -----------         ------                     Light Blue Top[[id number]]                                   Final result               Green Top (Gel)[[id number]]                                  Final result               Lavender Top[[id number]]                                     Final result               Gold Top - SST[[id number]]                                   Final result                 Please view results for these tests on the individual orders.    Light Blue Top [[id number]] Collected: 11/17/20 1818    Specimen: Blood Updated: 11/17/20 1931     Extra Tube hold for add-on     Comment: Auto resulted       Lavender Top [[id number]] Collected: 11/17/20 1818    Specimen: Blood Updated: 11/17/20 1931     Extra Tube hold  for add-on     Comment: Auto resulted       Green Top (Gel) [[id number]] Collected: 11/17/20 1818    Specimen: Blood Updated: 11/17/20 1931     Extra Tube Hold for add-ons.     Comment: Auto resulted.       Gold Top - SST [[id number]] Collected: 11/17/20 1818    Specimen: Blood Updated: 11/17/20 1931     Extra Tube Hold for add-ons.     Comment: Auto resulted.       Comprehensive Metabolic Panel [[id number]]  (Abnormal) Collected: 11/17/20 1818    Specimen: Blood Updated: 11/17/20 1856     Glucose 97 mg/dL      BUN 7 mg/dL      Creatinine 0.89 mg/dL      Sodium 138 mmol/L      Potassium 4.1 mmol/L      Chloride 105 mmol/L      CO2 24.3 mmol/L      Calcium 9.4 mg/dL      Total Protein 6.8 g/dL      Albumin 3.50 g/dL      ALT (SGPT) 15 U/L      AST (SGOT) 24 U/L      Alkaline Phosphatase 90 U/L      Total Bilirubin 0.6 mg/dL      eGFR   Amer 77 mL/min/1.73      Globulin 3.3 gm/dL      A/G Ratio 1.1 g/dL      BUN/Creatinine Ratio 7.9     Anion Gap 8.7 mmol/L     Narrative:      GFR Normal >60  Chronic Kidney Disease <60  Kidney Failure <15      Troponin [[id number]]  (Normal) Collected: 11/17/20 1818    Specimen: Blood Updated: 11/17/20 1856     Troponin T <0.010 ng/mL     Narrative:      Troponin T Reference Range:  <= 0.03 ng/mL-   Negative for AMI  >0.03 ng/mL-     Abnormal for myocardial necrosis.  Clinicians would have to utilize clinical acumen, EKG, Troponin and serial changes to determine if it is an Acute Myocardial Infarction or myocardial injury due to an underlying chronic condition.       Results may be falsely decreased if patient taking Biotin.      Magnesium [[id number]]  (Normal) Collected: 11/17/20 1818    Specimen: Blood Updated: 11/17/20 1856     Magnesium 2.0 mg/dL     Ethanol [[id number]] Collected: 11/17/20 1818    Specimen: Blood Updated: 11/17/20 1856     Ethanol <10 mg/dL      Ethanol % <0.010 %     CBC & Differential [[id number]]  (Abnormal) Collected: 11/17/20 1818    Specimen: Blood  Updated: 11/17/20 1835    Narrative:      The following orders were created for panel order CBC & Differential.  Procedure                               Abnormality         Status                     ---------                               -----------         ------                     CBC Auto Differential[[id number]]        Abnormal            Final result                 Please view results for these tests on the individual orders.    CBC Auto Differential [[id number]]  (Abnormal) Collected: 11/17/20 1818    Specimen: Blood Updated: 11/17/20 1835     WBC 6.18 10*3/mm3      RBC 5.20 10*6/mm3      Hemoglobin 16.2 g/dL      Hematocrit 48.6 %      MCV 93.5 fL      MCH 31.2 pg      MCHC 33.3 g/dL      RDW 13.1 %      RDW-SD 44.2 fl      MPV 11.4 fL      Platelets 170 10*3/mm3      Neutrophil % 56.1 %      Lymphocyte % 35.8 %      Monocyte % 5.7 %      Eosinophil % 1.6 %      Basophil % 0.5 %      Immature Grans % 0.3 %      Neutrophils, Absolute 3.47 10*3/mm3      Lymphocytes, Absolute 2.21 10*3/mm3      Monocytes, Absolute 0.35 10*3/mm3      Eosinophils, Absolute 0.10 10*3/mm3      Basophils, Absolute 0.03 10*3/mm3      Immature Grans, Absolute 0.02 10*3/mm3      nRBC 0.0 /100 WBC     Blood Gas, Arterial - [[id number]]  (Abnormal) Collected: 11/17/20 1814    Specimen: Arterial Blood Updated: 11/17/20 1817     Site Arterial: left radial     Augusto's Test Positive     pH, Arterial 7.392 pH units      pCO2, Arterial 42.5 mm Hg      pO2, Arterial 69.7 mm Hg      HCO3, Arterial 25.9 mmol/L      Base Excess, Arterial 0.6 mmol/L      O2 Saturation Calculated 93.5 %      A-a Gradiant 0.6 mmHg      Barometric Pressure for Blood Gas 764.6 mmHg      Modality Room Air     FIO2 21 %      Rate 16 Breaths/minute         Imaging Results (Last 24 Hours)     Procedure Component Value Units Date/Time    CT Head Without Contrast [[id number]] Collected: 11/17/20 2001     Updated: 11/17/20 2020    Narrative:      CT HEAD WITHOUT CONTRAST      HISTORY: Confusion     TECHNIQUE:  Head CT includes axial imaging from the base of skull to the  vertex without intravenous contrast. Radiation dose reduction techniques  were utilized, including automated exposure control and exposure  modulation based on body size.     COMPARISON:None     FINDINGS: There are no abnormal areas of increased attenuation  intra-axially to suggest hemorrhage. No extra-axial fluid collection is  observed. There is no evidence for cerebral edema, mass effect or shift  of the midline structures. Bone windows demonstrate no calvarial  abnormality. Mastoid air cells and visualized paranasal sinuses appear  clear.        Impression:      No evidence for acute intracranial abnormality.     This report was finalized on 11/17/2020 8:16 PM by Dr. Truman Kelly M.D.       XR Chest 1 View [[id number]] Collected: 11/17/20 1741     Updated: 11/17/20 1746    Narrative:      XR CHEST 1 VW-     HISTORY: Female who is 65 years-old, weakness     TECHNIQUE: Frontal view of the chest     COMPARISON: None available     FINDINGS: The heart size is normal. Aorta is calcified. Slight  prominence of vascular and interstitial markings. No focal pulmonary  consolidation, pleural effusion, or pneumothorax. Right hemidiaphragm is  elevated. No acute osseous process.       Impression:      No focal pulmonary consolidation. Follow-up as clinical  indications persist.     This report was finalized on 11/17/2020 5:42 PM by Dr. Emanuel Steiner M.D.           Orders (last 24 hrs)      Start     Ordered    11/19/20 0600  CBC (No Diff)  Morning Draw      11/18/20 1113    11/19/20 0600  Basic Metabolic Panel  Morning Draw      11/18/20 1113    11/19/20 0600  TSH  Morning Draw      11/18/20 1152    11/19/20 0600  T4, Free  Once      11/18/20 1152    11/18/20 1500  ipratropium-albuterol (DUO-NEB) nebulizer solution 3 mL  Every 8 Hours - RT      11/18/20 1158    11/18/20 1404  Initiate Observation Status  Once       11/18/20 1403    11/18/20 1200  Vital Signs  Every 4 Hours      11/18/20 1113    11/18/20 1153  Pharmacy Consult  Continuous PRN      11/18/20 1154    11/18/20 1151  albuterol (ACCUNEB) nebulizer solution 0.63 mg  Every 6 Hours PRN      11/18/20 1151    11/18/20 1118  enoxaparin (LOVENOX) syringe 40 mg  Every 24 Hours      11/18/20 1116    11/18/20 1115  sodium chloride 0.9 % flush 10 mL  Every 12 Hours Scheduled      11/18/20 1113    11/18/20 1114  Intake & Output  Every Shift      11/18/20 1113    11/18/20 1114  Weigh Patient  Once      11/18/20 1113    11/18/20 1114  Oxygen Therapy- Nasal Cannula; Titrate for SPO2: 90% - 95%  Continuous      11/18/20 1113    11/18/20 1114  Insert Peripheral IV  Once      11/18/20 1113    11/18/20 1114  Saline Lock & Maintain IV Access  Continuous      11/18/20 1113    11/18/20 1113  sodium chloride 0.9 % flush 10 mL  As Needed      11/18/20 1113    11/18/20 1113  Pharmacy to Dose enoxaparin (LOVENOX)  Continuous PRN      11/18/20 1113    11/18/20 1113  acetaminophen (TYLENOL) tablet 650 mg  Every 4 Hours PRN      11/18/20 1113    11/18/20 1112  ondansetron (ZOFRAN) tablet 4 mg  Every 6 Hours PRN      11/18/20 1113    11/18/20 1112  ondansetron (ZOFRAN) injection 4 mg  Every 6 Hours PRN      11/18/20 1113    11/18/20 1112  Code Status and Medical Interventions:  Continuous      11/18/20 1113    11/18/20 1109  Diet Regular; Cardiac  Diet Effective Now      11/18/20 1108    11/18/20 1109  PT Consult: Eval & Treat Discharge Placement Assessment, Unsuccessful Mobility by Nursing or Primary Service  Once     Comments: Reason Why PT Needed: decrease in mobility    11/18/20 1108    11/18/20 0900  aspirin EC tablet 81 mg  Daily      11/17/20 2351    11/18/20 0900  isosorbide mononitrate (IMDUR) 24 hr tablet 30 mg  Daily      11/17/20 2351 11/18/20 0900  liothyronine (CYTOMEL) tablet 25 mcg  Daily      11/17/20 2351 11/18/20 0900  lisinopril (PRINIVIL,ZESTRIL) tablet 10 mg  Daily       11/17/20 2351 11/18/20 0900  metoprolol succinate XL (TOPROL-XL) 24 hr tablet 50 mg  Daily      11/17/20 2351 11/18/20 0900  sennosides-docusate (PERICOLACE) 8.6-50 MG per tablet 1 tablet  Daily      11/17/20 2351 11/17/20 2353  mirtazapine (REMERON) tablet 45 mg  Nightly      11/17/20 2351 11/17/20 2353  nicotine (NICODERM CQ) 21 MG/24HR patch 1 patch  Every 24 Hours      11/17/20 2351 11/17/20 2353  potassium chloride (K-DUR,KLOR-CON) ER tablet 20 mEq  3 Times Daily      11/17/20 2351 11/17/20 2353  QUEtiapine (SEROquel) tablet 600 mg  Nightly      11/17/20 2351 11/17/20 2351  clonazePAM (KlonoPIN) tablet 1 mg  2 Times Daily PRN      11/17/20 2351 11/17/20 2335  Inpatient Access Center Consult  Once     Provider:  (Not yet assigned)    11/17/20 2335 11/17/20 2335  Inpatient Neurology Consult General  Once     Specialty:  Neurology  Provider:  Rufus Umanzor MD    11/17/20 2335 11/17/20 2333  POC Glucose Once  Once      11/17/20 2331 11/17/20 2305  Inpatient Case Management  Consult  Once     Provider:  (Not yet assigned)    11/17/20 2304 11/17/20 2055  COVID PRE-OP / PRE-PROCEDURE SCREENING ORDER (NO ISOLATION) - Swab, Nasopharynx  Once      11/17/20 2056 11/17/20 2055  Respiratory Panel PCR w/COVID-19(SARS-CoV-2) [facility]AN/GIAN/PAD/COR/MAD/MIRELA In-House, NP Swab in UTM/VTM, 3-4 HR TAT - Swab, Nasopharynx  PROCEDURE ONCE      11/17/20 2056 11/17/20 2035  POC Glucose Once  Once      11/17/20 2032 11/17/20 2027  Urinalysis, Microscopic Only - Urine, Clean Catch  Once      11/17/20 2026 11/17/20 1944  Inpatient Admission  Once      11/17/20 1943 11/17/20 1921  Urinalysis With Microscopic If Indicated (No Culture) - Urine, Catheter  Once,   Status:  Canceled      11/17/20 1920 11/17/20 1914  [facility] (on-call MD unless specified) Details  Once     Specialty:  Hospitalist  Provider:  (Not yet assigned)    11/17/20 1913 11/17/20 1913  COVID PRE-OP  / PRE-PROCEDURE SCREENING ORDER (NO ISOLATION) - Swab, Nasal Cavity  Once,   Status:  Canceled      11/17/20 1913 11/17/20 1913  COVID-19, [facility] IN-HOUSE,NP Swab (NO TRANSPORT MEDIA) 2 HR TAT - Swab, Nasal Cavity  PROCEDURE ONCE,   Status:  Canceled      11/17/20 1913 11/17/20 1818  Blood Gas, Arterial -  Once      11/17/20 1814    11/17/20 1722  Blood Gas, Arterial -  Once      11/17/20 1721    11/17/20 1718  Please try to get number from family member so I can give them a call to obtain history.  Misc Nursing Order (Specify)  Once     Comments: Please try to get number from family member so I can give them a call to obtain history.    11/17/20 1717    11/17/20 1713  POC Glucose Once  Once      11/17/20 1712    11/17/20 1713  Ethanol  Once      11/17/20 1712    11/17/20 1713  Urine Drug Screen - Urine, Clean Catch  Once      11/17/20 1712    11/17/20 1712  CT Head Without Contrast  1 Time Imaging      11/17/20 1712    11/17/20 1546  NPO Diet  Diet Effective Now,   Status:  Canceled      11/17/20 1545    11/17/20 1546  Undress and Gown  Once      11/17/20 1545    11/17/20 1546  Cardiac monitoring  Per Hospital Policy      11/17/20 1545    11/17/20 1546  Continuous Pulse Oximetry  Continuous      11/17/20 1545    11/17/20 1546  Vital signs  Per Hospital Policy      11/17/20 1545    11/17/20 1546  Orthostatic blood pressure  Once      11/17/20 1545    11/17/20 1546  Fall precautions  Continuous      11/17/20 1545    11/17/20 1546  XR Chest 1 View  1 Time Imaging      11/17/20 1545    11/17/20 1546  ECG 12 Lead  Once      11/17/20 1545    11/17/20 1546  POC Glucose Once  Once      11/17/20 1545    11/17/20 1546  Insert peripheral IV  Once      11/17/20 1545    11/17/20 1546  [place] Draw  Once      11/17/20 1545    11/17/20 1546  CBC & Differential  Once      11/17/20 1545    11/17/20 1546  Comprehensive Metabolic Panel  Once      11/17/20 1545    11/17/20 1546  Troponin  Once      11/17/20 1545    11/17/20 1546   Magnesium  Once      11/17/20 1545    11/17/20 1546  Urinalysis With Microscopic If Indicated (No Culture) - Urine, Clean Catch  Once      11/17/20 1545    11/17/20 1546  Light Blue Top  PROCEDURE ONCE      11/17/20 1545    11/17/20 1546  Green Top (Gel)  PROCEDURE ONCE      11/17/20 1545    11/17/20 1546  Lavender Top  PROCEDURE ONCE      11/17/20 1545    11/17/20 1546  Gold Top - SST  PROCEDURE ONCE      11/17/20 1545    11/17/20 1546  CBC Auto Differential  PROCEDURE ONCE      11/17/20 1545    11/17/20 1545  Oxygen Therapy- Nasal Cannula; 2 LPM; Titrate for SPO2: 92%, Greater Than or Equal To  Continuous PRN,   Status:  Canceled      11/17/20 1545    11/17/20 1545  sodium chloride 0.9 % flush 10 mL  As Needed      11/17/20 1545    10/28/20 0000  potassium chloride (K-DUR,KLOR-CON) 20 MEQ CR tablet  3 Times Daily      11/17/20 1832    10/27/20 0000  nicotine (NICODERM CQ) 21 MG/24HR patch  Every 24 Hours      11/17/20 1832    10/26/20 0000  aspirin (aspirin) 81 MG EC tablet  Daily      11/17/20 1832    10/26/20 0000  isosorbide mononitrate (IMDUR) 30 MG 24 hr tablet  Daily      11/17/20 1832    10/26/20 0000  metoprolol succinate XL (TOPROL-XL) 50 MG 24 hr tablet  Daily      11/17/20 1832 09/02/20 0000  liothyronine (CYTOMEL) 25 MCG tablet  Daily      11/17/20 1832 09/02/20 0000  mirtazapine (REMERON) 45 MG tablet  Every Night at Bedtime      11/17/20 1832    Unscheduled  Up With Assistance  As Needed      11/18/20 1113    --  QUEtiapine (SEROquel) 300 MG tablet  Nightly      11/17/20 1834    --  zolpidem (Ambien) 10 MG tablet  Nightly PRN      11/17/20 1834    --  clonazePAM (KlonoPIN) 1 MG tablet  2 Times Daily PRN      11/17/20 1834    --  HYDROcodone-acetaminophen (NORCO) 7.5-325 MG per tablet  Every 8 Hours PRN      11/17/20 1834    --  sennosides-docusate (Senexon-S) 8.6-50 MG per tablet  Daily      11/17/20 1834    --  lisinopril (PRINIVIL,ZESTRIL) 10 MG tablet  Daily      11/17/20 9727           "    Melani Mahajan, RN   Registered Nurse   Psychiatry   Consults   Signed   Date of Service:  20   Creation Time:  20         Consult Orders   Inpatient Access Center Consult [[id number]] ordered by Eufemia Cruz APRN at 20 1793          Signed             Show:Clear all  [x]Manual[]Template[]Copied    Added by:  [x]Melani Mahajan RN    []Hover for details  Pt is a 66 yo female seen in ED for AMS.  She is single, reports she lives with her daughter and is retired.  Pt is alert and oriented to self and place only.  Thinks the year is , unsure of president, thinks she is 64 yo.  She is pleasant, affect and mood are appropriate and she is cooperative with interview.  Pt reports no concerns.  She reports that she has no hx of psychiatric issues, does not take any psych meds.  She denies any psych hospitalizations and does not have substance/alcohol abuse issues.  She reports she was just in the hospital recently, cannot remember which one, and was \"not feeling too good\".  She cannot clarify exactly what her symptoms were at that time. Pt denies SI, HI and hallucinations. Pt give permission to call her daughter, Lambert Lopez at [phone].  Spoke with pt's daughter at length.  She reports that pt has a hx of bipolar/schizoaffective d/o and sees psychiatry at [facility].  She is unsure which MD, because they \"switch a lot\".  Pt is current with the practice and daughter notified them that pt was in hospital.  Pt is currently prescribed Seroquel 600mg nightly, Konopin 1mg BID PRN, and Remeron 45mg nightly. She also takes Ambien 10mg nightly for sleep.  Daughter states that pt has been doing \"just fine\" on this regimen for several years, though psychiatry had suggested lowering doses recently.  Daughter reports that pt was last hospitalized on [facility] in  for about a week.  This happened after pt's  , and pt started experiencing depression then hallucinations.  Daughter " "states that pt has been doing well since then but has been under \"a lot of pressure caring for her mother\".  Pt has been living with her mother and caring for her since October after a fall.  Pt has not been sleeping well r/t to getting up with her mother at night.  Daughter is worried because pt is taking all her night time meds but isn't able to \"sleep them off\" and has been getting more confused.  Daughter states pt has \"not been right since being out of hospital\".  Pt was hospitalized in October at [facility] for COPD.  Daughter states she has noticed pt being more forgetful/memory issues for the last 6 months.  Daughter states pt has taken on too much and \"I'm afraid it's causing a nervous breakdown\".  Dr Irvin has been consulted.  Access center will follow.                       "

## 2020-11-19 LAB
ANION GAP SERPL CALCULATED.3IONS-SCNC: 7.2 MMOL/L (ref 5–15)
BUN SERPL-MCNC: 7 MG/DL (ref 8–23)
BUN/CREAT SERPL: 9.2 (ref 7–25)
CALCIUM SPEC-SCNC: 8.7 MG/DL (ref 8.6–10.5)
CHLORIDE SERPL-SCNC: 109 MMOL/L (ref 98–107)
CO2 SERPL-SCNC: 23.8 MMOL/L (ref 22–29)
CREAT SERPL-MCNC: 0.76 MG/DL (ref 0.57–1)
DEPRECATED RDW RBC AUTO: 45.2 FL (ref 37–54)
ERYTHROCYTE [DISTWIDTH] IN BLOOD BY AUTOMATED COUNT: 13.2 % (ref 12.3–15.4)
GFR SERPL CREATININE-BSD FRML MDRD: 93 ML/MIN/1.73
GLUCOSE BLDC GLUCOMTR-MCNC: 100 MG/DL (ref 70–130)
GLUCOSE SERPL-MCNC: 109 MG/DL (ref 65–99)
HCT VFR BLD AUTO: 45.4 % (ref 34–46.6)
HGB BLD-MCNC: 14.7 G/DL (ref 12–15.9)
MCH RBC QN AUTO: 30.4 PG (ref 26.6–33)
MCHC RBC AUTO-ENTMCNC: 32.4 G/DL (ref 31.5–35.7)
MCV RBC AUTO: 93.8 FL (ref 79–97)
PLATELET # BLD AUTO: 148 10*3/MM3 (ref 140–450)
PMV BLD AUTO: 11.1 FL (ref 6–12)
POTASSIUM SERPL-SCNC: 4.2 MMOL/L (ref 3.5–5.2)
RBC # BLD AUTO: 4.84 10*6/MM3 (ref 3.77–5.28)
SODIUM SERPL-SCNC: 140 MMOL/L (ref 136–145)
T3FREE SERPL-MCNC: 2.13 PG/ML (ref 2–4.4)
T4 FREE SERPL-MCNC: 0.49 NG/DL (ref 0.93–1.7)
TSH SERPL DL<=0.05 MIU/L-ACNC: 0.96 UIU/ML (ref 0.27–4.2)
WBC # BLD AUTO: 5.52 10*3/MM3 (ref 3.4–10.8)

## 2020-11-19 PROCEDURE — 94799 UNLISTED PULMONARY SVC/PX: CPT

## 2020-11-19 PROCEDURE — 97116 GAIT TRAINING THERAPY: CPT

## 2020-11-19 PROCEDURE — 96372 THER/PROPH/DIAG INJ SC/IM: CPT

## 2020-11-19 PROCEDURE — 80048 BASIC METABOLIC PNL TOTAL CA: CPT | Performed by: NURSE PRACTITIONER

## 2020-11-19 PROCEDURE — G0378 HOSPITAL OBSERVATION PER HR: HCPCS

## 2020-11-19 PROCEDURE — 84439 ASSAY OF FREE THYROXINE: CPT | Performed by: NURSE PRACTITIONER

## 2020-11-19 PROCEDURE — 85027 COMPLETE CBC AUTOMATED: CPT | Performed by: NURSE PRACTITIONER

## 2020-11-19 PROCEDURE — 82962 GLUCOSE BLOOD TEST: CPT

## 2020-11-19 PROCEDURE — 97162 PT EVAL MOD COMPLEX 30 MIN: CPT

## 2020-11-19 PROCEDURE — 25010000002 ENOXAPARIN PER 10 MG: Performed by: NURSE PRACTITIONER

## 2020-11-19 PROCEDURE — 84443 ASSAY THYROID STIM HORMONE: CPT | Performed by: NURSE PRACTITIONER

## 2020-11-19 PROCEDURE — 36415 COLL VENOUS BLD VENIPUNCTURE: CPT | Performed by: NURSE PRACTITIONER

## 2020-11-19 PROCEDURE — 84481 FREE ASSAY (FT-3): CPT | Performed by: NURSE PRACTITIONER

## 2020-11-19 RX ORDER — QUETIAPINE FUMARATE 200 MG/1
400 TABLET, FILM COATED ORAL NIGHTLY
Status: DISCONTINUED | OUTPATIENT
Start: 2020-11-19 | End: 2020-11-21 | Stop reason: HOSPADM

## 2020-11-19 RX ORDER — CLONAZEPAM 0.5 MG/1
0.5 TABLET ORAL 2 TIMES DAILY PRN
Status: DISCONTINUED | OUTPATIENT
Start: 2020-11-19 | End: 2020-11-21 | Stop reason: HOSPADM

## 2020-11-19 RX ADMIN — DOCUSATE SODIUM 50MG AND SENNOSIDES 8.6MG 1 TABLET: 8.6; 5 TABLET, FILM COATED ORAL at 08:36

## 2020-11-19 RX ADMIN — IPRATROPIUM BROMIDE AND ALBUTEROL SULFATE 3 ML: 2.5; .5 SOLUTION RESPIRATORY (INHALATION) at 16:16

## 2020-11-19 RX ADMIN — ASPIRIN 81 MG: 81 TABLET, FILM COATED ORAL at 08:36

## 2020-11-19 RX ADMIN — POTASSIUM CHLORIDE 20 MEQ: 750 TABLET, EXTENDED RELEASE ORAL at 16:38

## 2020-11-19 RX ADMIN — METOPROLOL SUCCINATE 50 MG: 50 TABLET, EXTENDED RELEASE ORAL at 08:36

## 2020-11-19 RX ADMIN — QUETIAPINE FUMARATE 400 MG: 200 TABLET ORAL at 20:59

## 2020-11-19 RX ADMIN — NICOTINE 1 PATCH: 21 PATCH, EXTENDED RELEASE TRANSDERMAL at 00:56

## 2020-11-19 RX ADMIN — ACETAMINOPHEN 650 MG: 325 TABLET, FILM COATED ORAL at 08:40

## 2020-11-19 RX ADMIN — MIRTAZAPINE 45 MG: 30 TABLET, FILM COATED ORAL at 20:59

## 2020-11-19 RX ADMIN — ISOSORBIDE MONONITRATE 30 MG: 30 TABLET ORAL at 08:36

## 2020-11-19 RX ADMIN — SODIUM CHLORIDE, PRESERVATIVE FREE 10 ML: 5 INJECTION INTRAVENOUS at 08:36

## 2020-11-19 RX ADMIN — IPRATROPIUM BROMIDE AND ALBUTEROL SULFATE 3 ML: 2.5; .5 SOLUTION RESPIRATORY (INHALATION) at 23:38

## 2020-11-19 RX ADMIN — ENOXAPARIN SODIUM 40 MG: 100 INJECTION SUBCUTANEOUS at 11:39

## 2020-11-19 RX ADMIN — IPRATROPIUM BROMIDE AND ALBUTEROL SULFATE 3 ML: 2.5; .5 SOLUTION RESPIRATORY (INHALATION) at 08:04

## 2020-11-19 RX ADMIN — POTASSIUM CHLORIDE 20 MEQ: 750 TABLET, EXTENDED RELEASE ORAL at 20:59

## 2020-11-19 RX ADMIN — SODIUM CHLORIDE, PRESERVATIVE FREE 10 ML: 5 INJECTION INTRAVENOUS at 21:00

## 2020-11-19 RX ADMIN — LISINOPRIL 10 MG: 10 TABLET ORAL at 08:36

## 2020-11-19 RX ADMIN — POTASSIUM CHLORIDE 20 MEQ: 750 TABLET, EXTENDED RELEASE ORAL at 08:35

## 2020-11-19 RX ADMIN — LIOTHYRONINE SODIUM 25 MCG: 25 TABLET ORAL at 08:36

## 2020-11-19 NOTE — THERAPY EVALUATION
Patient Name: Sarah Lopez  : 1955    MRN: [id number]                              Today's Date: 2020       Admit Date: 2020    Visit Dx:     ICD-10-CM ICD-9-CM   1. Altered mental status, unspecified altered mental status type  R41.82 780.97   2. Bipolar affective disorder, remission status unspecified (CMS/LTAC, located within [facility])  F31.9 296.80     Patient Active Problem List   Diagnosis   • Altered mental status   • COPD (chronic obstructive pulmonary disease) (CMS/[facility], located within [facility])   • Schizoaffective disorder, bipolar type (CMS/[facility]AC, located within [facility])   • Essential hypertension   • Acquired hypothyroidism   • History of pulmonary embolus (PE)     Past Medical History:   Diagnosis Date   • Anxiety    • COPD (chronic obstructive pulmonary disease) (CMS/[facility]AC, located within [facility])    • Coronary artery disease    • Depression    • Hypertension      Past Surgical History:   Procedure Laterality Date   • HYSTERECTOMY       General Information     Row Name 20 1544          Physical Therapy Time and Intention    Document Type  evaluation  (Pended)   -AB     Mode of Treatment  physical therapy;individual therapy  (Pended)   -AB     Row Name 20 1544          General Information    Patient Profile Reviewed  yes  (Pended)   -AB     Prior Level of Function  independent:;ADL's;community mobility;all household mobility  (Pended)   -AB     Existing Precautions/Restrictions  fall  (Pended)   -AB     Barriers to Rehab  family issues;cognitive status  (Pended)   -AB     Row Name 20 1542          Living Environment    Lives With  parent(s)  (Pended)   -AB     Row Name 20 1544          Home Main Entrance    Number of Stairs, Main Entrance  none  (Pended)   -AB     Row Name 20 1548          Cognition    Orientation Status (Cognition)  oriented to;person  (Pended)  Difficult to understand other than yes or no answers  -AB     Row Name 20 154          Safety Issues, Functional Mobility    Safety Issues Affecting Function (Mobility)  awareness of need  for assistance  (Pended)   -AB     Impairments Affecting Function (Mobility)  cognition  (Pended)   -AB       User Key  (r) = Recorded By, (t) = Taken By, (c) = Cosigned By    Initials Name Provider Type    Silverio Menendez, PT Student PT Student        Mobility     Row Name 11/19/20 1545          Bed Mobility    Comment (Bed Mobility)  Supervision/SBA for bed mobility with moderate VCing  (Pended)   -AB     Row Name 11/19/20 1545          Transfers    Comment (Transfers)  CGA for STS transfer EOB to FWW to chair  (Pended)   -AB     Row Name 11/19/20 1545          Bed-Chair Transfer    Bed-Chair [place] (Transfers)  contact guard;1 person assist  (Pended)   -AB     Assistive Device (Bed-Chair Transfers)  walker, front-wheeled  (Pended)   -AB     Row Name 11/19/20 1545          Sit-Stand Transfer    Sit-Stand [place] (Transfers)  contact guard;1 person assist  (Pended)   -AB     Assistive Device (Sit-Stand Transfers)  walker, front-wheeled  (Pended)   -AB     Row Name 11/19/20 1545          Gait/Stairs (Locomotion)    [place] Level (Gait)  contact guard;1 person assist  (Pended)   -AB     Assistive Device (Gait)  walker, front-wheeled  (Pended)   -AB     Distance in Feet (Gait)  20 feet in room  (Pended)   -AB     [place] Level (Stairs)  not tested  (Pended)   -AB     Comment (Gait/Stairs)  Pt shows no significant gait deviations  (Pended)   -AB       User Key  (r) = Recorded By, (t) = Taken By, (c) = Cosigned By    Initials Name Provider Type    Silverio Menendez, PT Student PT Student        Obj/Interventions     Row Name 11/19/20 1547          Range of Motion Comprehensive    Comment, General Range of Motion  B LE WFL for ambulation  (Pended)   -AB     Row Name 11/19/20 1547          Strength Comprehensive (MMT)    Comment, General Manual Muscle Testing (MMT) Assessment  B LE WFL for ambulation  (Pended)   -AB     Row Name 11/19/20 1547          Balance    Balance Assessment  sitting static  balance;standing dynamic balance;standing static balance  (Pended)   -AB     Static Sitting Balance  WFL;mild impairment  (Pended)   -AB     Static Standing Balance  WFL  (Pended)   -AB     Dynamic Standing Balance  WFL  (Pended)   -AB     Comment, Balance  Pt has mild posterior lean in sitting, able to single leg stance with FWW bilat for 2 seconds  (Pended)   -AB       User Key  (r) = Recorded By, (t) = Taken By, (c) = Cosigned By    Initials Name Provider Type    Silverio Menendez, PT Student PT Student        Goals/Plan     Row Name 11/19/20 1549          Gait Training Goal 1 (PT)    Activity/Assistive Device (Gait Training Goal 1, PT)  gait (walking locomotion);assistive device use;increase endurance/gait distance  (Pended)   -AB     [place] Level (Gait Training Goal 1, PT)  supervision required;standby assist  (Pended)   -AB     Distance (Gait Training Goal 1, PT)  75 feet  (Pended)   -AB     Time Frame (Gait Training Goal 1, PT)  1 week  (Pended)   -AB     Progress/Outcome (Gait Training Goal 1, PT)  continuing progress toward goal  (Pended)   -AB       User Key  (r) = Recorded By, (t) = Taken By, (c) = Cosigned By    Initials Name Provider Type    Silverio Menendez, PT Student PT Student        Clinical Impression     [facility] Name 11/19/20 1544          Pain    Additional Documentation  Pain Scale: Numbers Pre/Post-Treatment (Group)  (Pended)   -AB     Row Name 11/19/20 1549          Pain Scale: Numbers Pre/Post-Treatment    Pretreatment Pain Rating  0/10 - no pain  (Pended)   -AB     Posttreatment Pain Rating  0/10 - no pain  (Pended)   -AB     Row Name 11/19/20 1540          Plan of Care Review    Plan of Care Reviewed With  patient  (Pended)   -AB     Progress  no change  (Pended)   -AB     Row Name 11/19/20 1540          Therapy Assessment/Plan (PT)    Patient/Family Therapy Goals Statement (PT)  DC to home  (Pended)   -AB     Rehab Potential (PT)  good, to achieve stated therapy goals  (Pended)   -AB      Criteria for Skilled Interventions Met (PT)  yes;meets criteria;skilled treatment is necessary  (Pended)   -AB       User Key  (r) = Recorded By, (t) = Taken By, (c) = Cosigned By    Initials Name Provider Type    Silverio Menendez, PT Student PT Student        Outcome Measures     Row Name 11/19/20 1550          How much help from another person do you currently need...    Turning from your back to your side while in flat bed without using bedrails?  3  (Pended)   -AB     Moving from lying on back to sitting on the side of a flat bed without bedrails?  3  (Pended)   -AB     Moving to and from a bed to a chair (including a wheelchair)?  3  (Pended)   -AB     Standing up from a chair using your arms (e.g., wheelchair, bedside chair)?  4  (Pended)   -AB     Climbing 3-5 steps with a railing?  2  (Pended)   -AB     To walk in hospital room?  3  (Pended)   -AB     AM-PAC 6 Clicks Score (PT)  18  (Pended)   -AB     Row Name 11/19/20 1550          Functional Assessment    Outcome Measure Options  AM-PAC 6 Clicks Basic Mobility (PT)  (Pended)   -AB       User Key  (r) = Recorded By, (t) = Taken By, (c) = Cosigned By    Initials Name Provider Type    Silverio Menendez, PT Student PT Student        Physical Therapy Education                 Title: PT OT SLP Therapies (Done)     Topic: Physical Therapy (Done)     Point: Mobility training (Done)     Learning Progress Summary           Patient Acceptance, E,TB, VU by AB at 11/19/2020 1550                   Point: Home exercise program (Done)     Learning Progress Summary           Patient Acceptance, E,TB, VU by AB at 11/19/2020 1550                   Point: Body mechanics (Done)     Learning Progress Summary           Patient Acceptance, E,TB, VU by AB at 11/19/2020 1550                   Point: Precautions (Done)     Learning Progress Summary           Patient Acceptance, E,TB, VU by AB at 11/19/2020 1550                               User Key     Initials Effective Dates Name  Provider Type Discipline    AB 10/12/20 -  Silverio Lafleur, PT Student PT Student PT              PT Recommendation and Plan  Planned Therapy Interventions (PT): (P) balance training, gait training, home exercise program, patient/family education, strengthening  Plan of Care Reviewed With: (P) patient  Progress: (P) no change  Outcome Summary: (P) Pt is a 64 yo F who was admitted to [facility] for altered mental status. Pt only oriented to person, thinks the year is 2021 and is difficult to understand unless pt answers yes or no questions. Pt able to supine > sit EOB with moderate verbal cueing, stand by assistance. Slight posterior lean in sitting. Pt STS transfer EOB to FWW with CGA. Pt able to ambulate 20 feet inside room with FWW and CGA, with no significant gait deviation but do not trust patient to be standy by/supervision due to current mental status. Will keep pt on caseload to monitor functional progression as medical treatment continues. DC recs pending improvement in cognition.     Time Calculation:   PT Charges     Row Name 11/19/20 1558             Time Calculation    Start Time  1525  (Pended)   -AB      Stop Time  1545  (Pended)   -AB      Time Calculation (min)  20 min  (Pended)   -AB      PT Received On  11/19/20  (Pended)   -AB      PT - Next Appointment  11/21/20  (Pended)   -AB      PT Goal Re-Cert Due Date  11/26/20  (Pended)   -AB         Time Calculation- PT    Total Timed Code Minutes- PT  18 minute(s)  (Pended)   -AB        User Key  (r) = Recorded By, (t) = Taken By, (c) = Cosigned By    Initials Name Provider Type    AB Silverio Lafleur, PT Student PT Student        Therapy Charges for Today     Code Description Service Date Service Provider Modifiers Qty    32700273460 HC GAIT TRAINING EA 15 MIN 11/19/2020 Silverio Lafleur, PT Student GP 1    53492429184 HC PT EVAL MOD COMPLEXITY 2 11/19/2020 Silverio Lafleur, PT Student GP 1          PT G-Codes  Outcome Measure Options: (P) AM-PAC 6 Clicks Basic Mobility  (PT)  AM-PAC 6 Clicks Score (PT): (P) 18    Silverio Lafleur, PT Student  11/19/2020

## 2020-11-19 NOTE — CONSULTS
"IDENTIFYING INFORMATION: The patient is a 65-year-old -American female admitted with recent mental status changes    CHIEF COMPLAINT: None given    INFORMANT: Patient and chart    RELIABILITY: Fair    HISTORY OF PRESENT ILLNESS: The patient is a 65-year-old white female admitted on 11/17/2020 with \"mental status changes\".  Family reports that the patient has had increasing confusion since an admission at [facility] in October related to exacerbation of COPD.  The patient is diagnosed with schizoaffective disorder and followed at [facility].  Her currently prescribed psychiatric medications include Klonopin, Seroquel, Remeron and Ambien.  The patient has been caring for her elderly mother which is caused a great deal of stress for her.  When seen today the patient is noted to be extremely groggy and in fact falls asleep during attempted interview she was admitted to the crisis management unit in 2011 following the death of her .  According to her daughter she has done well on her psychotropic medication regimen but has been recently recommended that she consider reduction in some of her medication doses secondary to daytime sedation.    PAST PSYCHIATRIC HISTORY: As above    PAST MEDICAL HISTORY: Significant for COPD, hypertension and hypothyroidism    MEDICATIONS:   Current Facility-Administered Medications   Medication Dose Route Frequency Provider Last Rate Last Admin   • acetaminophen (TYLENOL) tablet 650 mg  650 mg Oral Q4H PRN Melani Malone APRN   650 mg at 11/19/20 0840   • albuterol (ACCUNEB) nebulizer solution 0.63 mg  0.63 mg Nebulization Q6H PRN Melani Malone APRN       • aspirin EC tablet 81 mg  81 mg Oral Daily Eufemia Cruz APRN   81 mg at 11/19/20 0836   • clonazePAM (KlonoPIN) tablet 0.5 mg  0.5 mg Oral BID PRN Beto Irvin III, MD       • enoxaparin (LOVENOX) syringe 40 mg  40 mg Subcutaneous Q24H Melani Malone APRN   40 " mg at 11/18/20 1611   • ipratropium-albuterol (DUO-NEB) nebulizer solution 3 mL  3 mL Nebulization Q8H - RT Melani Malone APRN   3 mL at 11/19/20 0804   • isosorbide mononitrate (IMDUR) 24 hr tablet 30 mg  30 mg Oral Daily Eufemia Cruz APRN   30 mg at 11/19/20 0836   • liothyronine (CYTOMEL) tablet 25 mcg  25 mcg Oral Daily Eufemia Cruz APRN   25 mcg at 11/19/20 0836   • lisinopril (PRINIVIL,ZESTRIL) tablet 10 mg  10 mg Oral Daily Eufemia Cruz APRN   10 mg at 11/19/20 0836   • metoprolol succinate XL (TOPROL-XL) 24 hr tablet 50 mg  50 mg Oral Daily Eufemia Cruz APRN   50 mg at 11/19/20 0836   • mirtazapine (REMERON) tablet 45 mg  45 mg Oral Nightly Eufemia Cruz APRN   45 mg at 11/18/20 2227   • nicotine (NICODERM CQ) 21 MG/24HR patch 1 patch  1 patch Transdermal Q24H Eufemia Cruz APRN   1 patch at 11/19/20 0056   • ondansetron (ZOFRAN) tablet 4 mg  4 mg Oral Q6H PRN Melani Malone APRN        Or   • ondansetron (ZOFRAN) injection 4 mg  4 mg Intravenous Q6H PRN Melani Malone APRN       • Pharmacy to Dose enoxaparin (LOVENOX)   Does not apply Continuous PRN Melani Malone APRN       • potassium chloride (K-DUR,KLOR-CON) ER tablet 20 mEq  20 mEq Oral TID Eufemia Cruz APRN   20 mEq at 11/19/20 0835   • QUEtiapine (SEROquel) tablet 400 mg  400 mg Oral Nightly Beto Irvin III, MD       • sennosides-docusate (PERICOLACE) 8.6-50 MG per tablet 1 tablet  1 tablet Oral Daily Eufemia Cruz APRN   1 tablet at 11/19/20 0836   • sodium chloride 0.9 % flush 10 mL  10 mL Intravenous PRN Michael Barba MD       • sodium chloride 0.9 % flush 10 mL  10 mL Intravenous Q12H Melani Malone APRN   10 mL at 11/19/20 0836   • sodium chloride 0.9 % flush 10 mL  10 mL Intravenous PRN Melani Malone APRN             ALLERGIES: None    FAMILY HISTORY: Noncontributory    SOCIAL HISTORY: No reported use of alcohol  tobacco or street drugs    MENTAL STATUS EXAM: The patient is a well-developed well-nourished -American female appearing her stated age.  She is in no physical distress during interview.  She is very groggy and in fact falls asleep mid interview.  She is oriented x3.  Her mood is calm her affect blunted.  Speech is impoverished.  Patient does not comply for testing of memory cognition.  She does not report suicidal or homicidal ideation and does not appear to be responding to any internal stimuli.    ASSETS/LIABILITIES: To be assessed    DIAGNOSTIC IMPRESSION: Schizoaffective disorder, hypothyroidism, hypertension, COPD    PLAN: I am in agreement that if the patient is experiencing significant daytime sedation that downward titration of medications is warranted.  Accordingly, I will reduce her as needed dose of Klonopin to 0.5 mg and reduce her nighttime dose of Seroquel to 400 mg, continuing previously prescribed Remeron.  I would also recommend that the patient undergo a neuropsychological evaluation in the near future given family's concerns regarding her confusion.    Thank you for the opportunity to see this patient

## 2020-11-19 NOTE — PLAN OF CARE
Unsure as to PLOF due to patient's current mental status. She states she lives with her mother and uses a walker for household distances, unsure as to accuracy of this. Will reach out to family to confirm PLOF.      Problem: Adult Inpatient Plan of Care  Goal: Plan of Care Review  Outcome: Ongoing, Progressing  Flowsheets  Taken 11/19/2020 1550  Progress: no change (Pended)  Outcome Summary: Pt is a 64 yo F who was admitted to [facility] for altered mental status. Pt only oriented to person, cues needed for orientation to place and time, inappropriate answers given throughout. Pt able to supine > sit EOB with moderate verbal cueing, stand by assistance. Slight posterior lean in sitting. Pt STS transfer EOB to FWW with CGA. Pt able to ambulate 20 feet inside room with FWW and CGA, with no significant gait deviation but do not trust patient to be standy by/supervision due to current mental status. Will keep pt on caseload to monitor functional progression as medical treatment continues. Frequency 2 x week to follow plan of care and maintain current functional status throughout hospital course. DC recs pending improvement in cognition.     Patient was wearing a face mask during this therapy encounter. Therapist used appropriate personal protective equipment including eye protection, mask, and gloves.  Mask used was standard procedure mask. Appropriate PPE was worn during the entire therapy session. Hand hygiene was completed before and after therapy session. Patient is not in enhanced droplet precautions.     (Pended)  Taken 11/19/2020 0363  Plan of Care Reviewed With: patient (Pended)

## 2020-11-19 NOTE — PROGRESS NOTES
Name: Sarah Lopez ADMIT: 2020   : 1955  PCP: Dixon Miller MD    MRN: [id number] LOS: 0 days   AGE/SEX: 65 y.o. female  ROOM: [facility]     Subjective   Subjective   Drowsy this morning. Ate some breakfast. Awakens to voice but having trouble staying awake. No complaints voiced.    Review of Systems   Reason unable to perform ROS: difficult to complete full ROS due to drowsiness.        Objective   Objective   Vital Signs  Temp:  [97 °F (36.1 °C)-98.9 °F (37.2 °C)] 97.3 °F (36.3 °C)  Heart Rate:  [67-81] 78  Resp:  [16-20] 16  BP: (109-134)/(78-91) 130/85  SpO2:  [78 %-98 %] 95 %  on  Flow (L/min):  [2-6] 2;   Device (Oxygen Therapy): nasal cannula  Body mass index is 37.64 kg/m².  Physical Exam  Vitals signs and nursing note reviewed.   Constitutional:       General: She is not in acute distress.     Appearance: She is obese.      Comments: Drowsy, awakens to voice but has difficulty staying awake   HENT:      Head: Normocephalic.   Eyes:      Conjunctiva/sclera: Conjunctivae normal.   Neck:      Musculoskeletal: Neck supple.   Cardiovascular:      Rate and Rhythm: Normal rate and regular rhythm.   Pulmonary:      Effort: Pulmonary effort is normal. No respiratory distress.      Breath sounds: Normal breath sounds.   Abdominal:      General: Bowel sounds are normal.      Palpations: Abdomen is soft.   Musculoskeletal:      Right lower leg: No edema.      Left lower leg: No edema.   Skin:     General: Skin is warm and dry.   Neurological:      General: No focal deficit present.         Results Review     I reviewed the patient's new clinical results.  Results from last 7 days   Lab Units 20  0707 20  1818   WBC 10*3/mm3 5.52 6.18   HEMOGLOBIN g/dL 14.7 16.2*   PLATELETS 10*3/mm3 148 170     Results from last 7 days   Lab Units 20  0707 20  1818   SODIUM mmol/L 140 138   POTASSIUM mmol/L 4.2 4.1   CHLORIDE mmol/L 109* 105   CO2 mmol/L 23.8 24.3   BUN mg/dL 7* 7*    CREATININE mg/dL 0.76 0.89   GLUCOSE mg/dL 109* 97   Estimated Creatinine Clearance: 86.3 mL/min (by C-G formula based on SCr of 0.76 mg/dL).  Results from last 7 days   Lab Units 11/17/20  1818   ALBUMIN g/dL 3.50   BILIRUBIN mg/dL 0.6   ALK PHOS U/L 90   AST (SGOT) U/L 24   ALT (SGPT) U/L 15     Results from last 7 days   Lab Units 11/19/20  0707 11/17/20  1818   CALCIUM mg/dL 8.7 9.4   ALBUMIN g/dL  --  3.50   MAGNESIUM mg/dL  --  2.0       COVID19   Date Value Ref Range Status   11/17/2020 Not Detected Not Detected - Ref. Range Final     Glucose   Date/Time Value Ref Range Status   11/19/2020 0606 100 70 - 130 mg/dL Final   11/18/2020 2110 94 70 - 130 mg/dL Final   11/17/2020 2331 94 70 - 130 mg/dL Final   11/17/2020 2032 91 70 - 130 mg/dL Final       CT Head Without Contrast  Narrative: CT HEAD WITHOUT CONTRAST     HISTORY: Confusion     TECHNIQUE:  Head CT includes axial imaging from the base of skull to the  vertex without intravenous contrast. Radiation dose reduction techniques  were utilized, including automated exposure control and exposure  modulation based on body size.     COMPARISON:None     FINDINGS: There are no abnormal areas of increased attenuation  intra-axially to suggest hemorrhage. No extra-axial fluid collection is  observed. There is no evidence for cerebral edema, mass effect or shift  of the midline structures. Bone windows demonstrate no calvarial  abnormality. Mastoid air cells and visualized paranasal sinuses appear  clear.      Impression: No evidence for acute intracranial abnormality.     This report was finalized on 11/17/2020 8:16 PM by Dr. Truman Kelly M.D.     XR Chest 1 View  Narrative: XR CHEST 1 VW-     HISTORY: Female who is 65 years-old, weakness     TECHNIQUE: Frontal view of the chest     COMPARISON: None available     FINDINGS: The heart size is normal. Aorta is calcified. Slight  prominence of vascular and interstitial markings. No focal pulmonary  consolidation,  pleural effusion, or pneumothorax. Right hemidiaphragm is  elevated. No acute osseous process.     Impression: No focal pulmonary consolidation. Follow-up as clinical  indications persist.     This report was finalized on 11/17/2020 5:42 PM by Dr. Emanuel Steiner M.D.       Scheduled Medications  aspirin, 81 mg, Oral, Daily  enoxaparin, 40 mg, Subcutaneous, Q24H  ipratropium-albuterol, 3 mL, Nebulization, Q8H - RT  isosorbide mononitrate, 30 mg, Oral, Daily  liothyronine, 25 mcg, Oral, Daily  lisinopril, 10 mg, Oral, Daily  metoprolol succinate XL, 50 mg, Oral, Daily  mirtazapine, 45 mg, Oral, Nightly  nicotine, 1 patch, Transdermal, Q24H  potassium chloride, 20 mEq, Oral, TID  QUEtiapine, 600 mg, Oral, Nightly  sennosides-docusate, 1 tablet, Oral, Daily  sodium chloride, 10 mL, Intravenous, Q12H    Infusions  Pharmacy to Dose enoxaparin (LOVENOX),     Diet  Diet Regular; Cardiac       Assessment/Plan     Active Hospital Problems    Diagnosis  POA   • **Altered mental status [R41.82]  Yes   • COPD (chronic obstructive pulmonary disease) (CMS/HCC) [J44.9]  Yes   • Schizoaffective disorder, bipolar type (CMS/HCC) [F25.0]  Yes   • Essential hypertension [I10]  Yes   • Acquired hypothyroidism [E03.9]  Yes   • History of pulmonary embolus (PE) [Z86.711]  Yes      Resolved Hospital Problems   No resolved problems to display.       65 y.o. female admitted with Altered mental status.    AMS/Hx schizoaffective disorder/bipolar:   -No evidence of infection; CTH negative. ? Medication effects vs underlying psychiatric condition  -Access has seen; Dr. Irvin consulted, pending. Previous [facility] admission 2011. [facility]  -Will hold Ambien & hydrocodone; continue Seroquel, Remeron, PRN klonopin for now  -Neurology also consulted to evaluate; pending     COPD:  -Not in acute exacerbation  -Continue inhalers, O2  -CXR negative     HTN:  -Continue home meds  -BP controlled     Hypothyroidism:  -TSH  0.956 FT4 0.49 (3.440, 0.58 3/2020); will check T3  -liothyronine    Nursing verified with patient pharmacy; meds are current per their review     · Lovenox 40 mg SC daily for DVT prophylaxis.  · Full code.  · Discussed with patient and Dr. Mijares.  · Anticipate discharge TBD.        EMELY Mcadams  [facility]  11/19/20  11:09 EST    Patient was wearing facemask when I entered the room and throughout our encounter.  I wore protective equipment throughout this patient encounter including a face mask, gloves and protective eyewear.  Hand hygiene was performed before donning protective equipment and after removal when leaving the room.

## 2020-11-19 NOTE — PROGRESS NOTES
"Discharge Planning Assessment  [facility]     Patient Name: Sarah Lopez  MRN: [id number]  Today's Date: 11/19/2020    Admit Date: 11/17/2020    Discharge Needs Assessment     Row Name 11/19/20 3124       Living Environment    Lives With  alone    Current Living Arrangements  home/apartment/condo    Primary Care Provided by  self    Provides Primary Care For  -- Helps her mother    Family Caregiver if Needed  child(lizeth), adult    Quality of Family Relationships  helpful;involved;supportive    Able to Return to Prior Arrangements  yes       Resource/Environmental Concerns    Resource/Environmental Concerns  none       Transition Planning    Patient/Family Anticipates Transition to  home    Transportation Anticipated  family or friend will provide       Discharge Needs Assessment    Equipment Currently Used at Home  cpap;oxygen;grab bar;shower chair;cane, straight    Concerns to be Addressed  denies needs/concerns at this time    Provided Post Acute Provider List?  N/A    N/A Provider List Comment  Denies needs. Plan is home        Discharge Plan     Row Name 11/19/20 8817       Plan    Plan  Home    Patient/Family in Agreement with Plan  yes    Plan Comments  JEFFERSON noted. Introduced self and role of CCP. Facesheet verified. Patient lives alone in a second floor apartment. Uses elevator to reach her home. 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 Stated she has been doing this for approx 4 years. Patient is in/out her apartment and her mother's apartment. Prior to admission, patient was independent with ADL's and continued to drive. Uses a cane \"most of the time\". Uses standard tub/shower with shower chair, grab bars and hand-held shower nozzel. Has been on O2 at 2L/NC and CPAP at HS for approx 2 months. Obtains supplies through [employer]. Denies any supply needs. Has never used a HH agency nor been to a rehab facility. DC plan is to return home. Stated family will assist as needed " and daughter will provide transportation at [place]. Denes any needs/equipment.        Continued Care and Services - Admitted Since 11/17/2020    Coordination has not been started for this encounter.         Demographic Summary     Row Name 11/19/20 8987       General Information    Admission Type  observation    Arrived From  home    Required Notices Provided  Observation Status Notice    Reason for Consult  discharge planning    Preferred Language  English     Used During This Interaction  no        Functional Status     Row Name 11/19/20 1357       Functional Status    Usual Activity Tolerance  moderate    Current Activity Tolerance  moderate       Functional Status, IADL    Medications  independent    Meal Preparation  independent    Housekeeping  independent    Laundry  independent       Mental Status    General Appearance WDL  WDL       Employment/    Employment Status  retired        Psychosocial     Row Name 11/19/20 1359       Values/Beliefs    Spiritual, Cultural Beliefs, Advent Practices, Values that Affect Care  no       Behavior WDL    Behavior WDL  WDL       Emotion Mood WDL    Emotion/Mood/Affect WDL  WDL       Speech WDL    Speech WDL  WDL       Intellectual Performance WDL    Level of Consciousness  Alert       Coping/Stress    Sources of Support  adult child(lizeth)    Reaction to Health Status  adjusting       Developmental Stage (Eriksson's)    Developmental Stage  Stage 7 (35-65 years/Middle Adulthood) Generativity vs. Stagnation        Abuse/Neglect     Row Name 11/19/20 3630       Personal Safety    Feels Unsafe at Home or Work/School  no    Feels Threatened by Someone  no    Does Anyone Try to Keep You From Having Contact with Others or Doing Things Outside Your Home?  no    Physical Signs of Abuse Present  no        Manju Tabares RN

## 2020-11-19 NOTE — PLAN OF CARE
Goal Outcome Evaluation:  Plan of Care Reviewed With: patient  Progress: no change  Outcome Summary: Cooperative. Sl confused/forgetful. Bed alarm on for safety. O2 requirements up to 6 liters to keep sats up. No acute sob --lungs sound decreased. Afebrile and VS stable. Ambulkates with assist and walker.Continent. Voiding QS and stools are yellow and loose. No complaints voiced

## 2020-11-19 NOTE — PLAN OF CARE
Goal Outcome Evaluation:  Plan of Care Reviewed With: patient  Progress: no change  Outcome Summary: Confusion intermittent. O2 weaned to RA. PT eval. Up in chair. Amb w/walker. Scheduled duo-neb tx. Psych eval and adjusted dosage on night meds. Bed alarm for safety.

## 2020-11-20 LAB — GLUCOSE BLDC GLUCOMTR-MCNC: 117 MG/DL (ref 70–130)

## 2020-11-20 PROCEDURE — 94799 UNLISTED PULMONARY SVC/PX: CPT

## 2020-11-20 PROCEDURE — 96372 THER/PROPH/DIAG INJ SC/IM: CPT

## 2020-11-20 PROCEDURE — 82962 GLUCOSE BLOOD TEST: CPT

## 2020-11-20 PROCEDURE — G0378 HOSPITAL OBSERVATION PER HR: HCPCS

## 2020-11-20 PROCEDURE — 25010000002 ENOXAPARIN PER 10 MG: Performed by: NURSE PRACTITIONER

## 2020-11-20 RX ADMIN — NICOTINE 1 PATCH: 21 PATCH, EXTENDED RELEASE TRANSDERMAL at 00:09

## 2020-11-20 RX ADMIN — LIOTHYRONINE SODIUM 25 MCG: 25 TABLET ORAL at 15:01

## 2020-11-20 RX ADMIN — QUETIAPINE FUMARATE 400 MG: 200 TABLET ORAL at 20:42

## 2020-11-20 RX ADMIN — POTASSIUM CHLORIDE 20 MEQ: 750 TABLET, EXTENDED RELEASE ORAL at 17:08

## 2020-11-20 RX ADMIN — DOCUSATE SODIUM 50MG AND SENNOSIDES 8.6MG 1 TABLET: 8.6; 5 TABLET, FILM COATED ORAL at 10:27

## 2020-11-20 RX ADMIN — ENOXAPARIN SODIUM 40 MG: 100 INJECTION SUBCUTANEOUS at 10:38

## 2020-11-20 RX ADMIN — POTASSIUM CHLORIDE 20 MEQ: 750 TABLET, EXTENDED RELEASE ORAL at 20:42

## 2020-11-20 RX ADMIN — METOPROLOL SUCCINATE 50 MG: 50 TABLET, EXTENDED RELEASE ORAL at 15:01

## 2020-11-20 RX ADMIN — LISINOPRIL 10 MG: 10 TABLET ORAL at 15:01

## 2020-11-20 RX ADMIN — ISOSORBIDE MONONITRATE 30 MG: 30 TABLET ORAL at 15:01

## 2020-11-20 RX ADMIN — SODIUM CHLORIDE, PRESERVATIVE FREE 10 ML: 5 INJECTION INTRAVENOUS at 20:43

## 2020-11-20 RX ADMIN — POTASSIUM CHLORIDE 20 MEQ: 750 TABLET, EXTENDED RELEASE ORAL at 10:26

## 2020-11-20 RX ADMIN — MIRTAZAPINE 45 MG: 30 TABLET, FILM COATED ORAL at 20:42

## 2020-11-20 RX ADMIN — IPRATROPIUM BROMIDE AND ALBUTEROL SULFATE 3 ML: 2.5; .5 SOLUTION RESPIRATORY (INHALATION) at 22:31

## 2020-11-20 RX ADMIN — ASPIRIN 81 MG: 81 TABLET, FILM COATED ORAL at 10:26

## 2020-11-20 NOTE — PROGRESS NOTES
"Continued Stay Note  [facility]     Patient Name: Sarah Lopez  MRN: [id number]  Today's Date: 11/20/2020    Admit Date: 11/17/2020    Discharge Plan     Row Name 11/20/20 1419       Plan    Plan  Home with HH vs Rehab    Plan Comments  Met with patient to discuss DC plans. Patient still stating she is going home to her apartment. Patient agreeable for CCP to call daughter to discuss DC plan. Spoke with daughter, Rocco Lopez at [phone]. Discussed PT's notes of patient only walking 20ft. Daughter stated she normally walks \"all around with her walker\".  Daughter stated patient has [facility] that come 2x/week. Confirmed with [facility] that patient is current with them. Referral placed in [facility]. Daughter stated she has talked with the patient this AM and that she thought the patient was still confused but better than when she was admitted. Daughter did confirm that patient uses O2 at 2L/NC but that the patient is not on CPAP. Patient does has cane, walker and shower chair. Stated patient does have her own apartment but stay most of the time at patient mother's apartment. Discussed poss rehab. Daughter stated she would need to \"research\" and discuss with patient. Daughter stated she has internet access so instructed her to go to medicare.gov, nursing home comparison to review. Explained to daughter that this needed to be completed ASAP as patient is ready for DC. Daughter verbalized understanding. Spoke to Melani FARIAS and Dr. Mijares to update them on conversation. Msg left for weekend CCP to follow up.        Discharge Codes    No documentation.             Manju Tabares RN    "

## 2020-11-20 NOTE — DISCHARGE PLACEMENT REQUEST
"Sarah Lopez (65 y.o. Female)     Date of Birth Social Security Number Address Home Phone MRN    1955  [id number]    [facility] [address]  [id number]    Spiritism Marital Status          None Single       Admission Date Admission Type Admitting Provider Attending Provider Department, Room/Bed    11/17/20 Emergency Michael Kearns MD Nguyen, Minh Loc, MD [address], 86/[address]    Discharge Date Discharge Disposition Discharge Destination                       Attending Provider: Melquiades Mijares MD    Allergies: No Known Allergies    Isolation: None   Infection: None   Code Status: CPR    Ht: 167.6 cm (66\")   Wt: 106 kg (233 lb 3.2 oz)    Admission Cmt: None   Principal Problem: Altered mental status [R41.82]                 Active Insurance as of 11/17/2020     Primary Coverage     Payor Plan Insurance Group Employer/Plan Group    [facility] [employer] MEDICARE REPLACEMENT      Payor Plan Address Payor Plan Phone Number Payor Plan Fax Number Effective Dates    [address] [address] [phone]  11/1/2020 - None Entered    [facility] [id number]       Subscriber Name Subscriber Birth Date Member ID       SARAH LOPEZ 1955 [id number]           Secondary Coverage     Payor Plan Insurance Group Employer/Plan Group    [place]      Payor Plan Address Payor Plan Phone Number Payor Plan Fax Number Effective Dates    [address] [phone]  11/17/2020 - None Entered    [facility][1] [id number]       Subscriber Name Subscriber Birth Date Member ID       SARAH LOPEZ 1955 [id number]                 Emergency Contacts      (Rel.) Home Phone Work Phone Mobile Phone    EVONNE LOPEZ (Daughter) -- -- [phone]              "

## 2020-11-20 NOTE — PLAN OF CARE
Goal Outcome Evaluation:  Plan of Care Reviewed With: patient  Progress: improving  Outcome Summary: VSS. Alert and oriented, forgetful at times. O2 @ 2L/min ( on O2 2L@ home 24/7 as stated by patient  ). Up with assist to the BR. Slept on and off.

## 2020-11-20 NOTE — PLAN OF CARE
Goal Outcome Evaluation:  Plan of Care Reviewed With: patient  Progress: improving   Vss, no complaints voiced , [place] diet well , plans for poss discharge tomorrow

## 2020-11-20 NOTE — PROGRESS NOTES
Name: Sarah Lopez ADMIT: 2020   : 1955  PCP: Dixon Miller MD    MRN: [id number] LOS: 0 days   AGE/SEX: 65 y.o. female  ROOM: [facility]     Subjective   Subjective   Drowsy again this morning. Wakes easily but unable to stay awake. Had not eaten breakfast. Was slouched in bed but stated she was comfortable. PT evaluation reviewed from yesterday.    Review of Systems   Reason unable to perform ROS: Denied pain otherwise pt unable to stay awake to complete ROS.        Objective   Objective   Vital Signs  Temp:  [96.8 °F (36 °C)-98.5 °F (36.9 °C)] 96.8 °F (36 °C)  Heart Rate:  [72-92] 87  Resp:  [16-18] 16  BP: (119-138)/(81-85) 119/85  SpO2:  [93 %-97 %] 93 %  on  Flow (L/min):  [2] 2;   Device (Oxygen Therapy): nasal cannula  Body mass index is 37.64 kg/m².  Physical Exam  Vitals signs and nursing note reviewed.   Constitutional:       General: She is not in acute distress.     Appearance: She is obese.   HENT:      Head: Normocephalic.   Eyes:      General: Lids are normal.   Neck:      Musculoskeletal: Neck supple.   Cardiovascular:      Rate and Rhythm: Normal rate and regular rhythm.   Pulmonary:      Effort: Pulmonary effort is normal. No respiratory distress.      Breath sounds: Normal breath sounds.   Abdominal:      General: Bowel sounds are normal.      Palpations: Abdomen is soft.   Musculoskeletal:      Right lower leg: No edema.      Left lower leg: No edema.   Skin:     General: Skin is warm and dry.   Neurological:      Comments: Drowsy         Results Review     I reviewed the patient's new clinical results.  Results from last 7 days   Lab Units 20  0707 20  1818   WBC 10*3/mm3 5.52 6.18   HEMOGLOBIN g/dL 14.7 16.2*   PLATELETS 10*3/mm3 148 170     Results from last 7 days   Lab Units 20  0707 20  1818   SODIUM mmol/L 140 138   POTASSIUM mmol/L 4.2 4.1   CHLORIDE mmol/L 109* 105   CO2 mmol/L 23.8 24.3   BUN mg/dL 7* 7*   CREATININE mg/dL 0.76 0.89    GLUCOSE mg/dL 109* 97   Estimated Creatinine Clearance: 86.3 mL/min (by C-G formula based on SCr of 0.76 mg/dL).  Results from last 7 days   Lab Units 11/17/20  1818   ALBUMIN g/dL 3.50   BILIRUBIN mg/dL 0.6   ALK PHOS U/L 90   AST (SGOT) U/L 24   ALT (SGPT) U/L 15     Results from last 7 days   Lab Units 11/19/20  0707 11/17/20  1818   CALCIUM mg/dL 8.7 9.4   ALBUMIN g/dL  --  3.50   MAGNESIUM mg/dL  --  2.0       COVID19   Date Value Ref Range Status   11/17/2020 Not Detected Not Detected - Ref. Range Final     Glucose   Date/Time Value Ref Range Status   11/20/2020 1101 117 70 - 130 mg/dL Final   11/19/2020 0606 100 70 - 130 mg/dL Final   11/18/2020 2110 94 70 - 130 mg/dL Final   11/17/2020 2331 94 70 - 130 mg/dL Final   11/17/2020 2032 91 70 - 130 mg/dL Final       CT Head Without Contrast  Narrative: CT HEAD WITHOUT CONTRAST     HISTORY: Confusion     TECHNIQUE:  Head CT includes axial imaging from the base of skull to the  vertex without intravenous contrast. Radiation dose reduction techniques  were utilized, including automated exposure control and exposure  modulation based on body size.     COMPARISON:None     FINDINGS: There are no abnormal areas of increased attenuation  intra-axially to suggest hemorrhage. No extra-axial fluid collection is  observed. There is no evidence for cerebral edema, mass effect or shift  of the midline structures. Bone windows demonstrate no calvarial  abnormality. Mastoid air cells and visualized paranasal sinuses appear  clear.      Impression: No evidence for acute intracranial abnormality.     This report was finalized on 11/17/2020 8:16 PM by Dr. Truman Kelly M.D.     XR Chest 1 View  Narrative: XR CHEST 1 VW-     HISTORY: Female who is 65 years-old, weakness     TECHNIQUE: Frontal view of the chest     COMPARISON: None available     FINDINGS: The heart size is normal. Aorta is calcified. Slight  prominence of vascular and interstitial markings. No focal  pulmonary  consolidation, pleural effusion, or pneumothorax. Right hemidiaphragm is  elevated. No acute osseous process.     Impression: No focal pulmonary consolidation. Follow-up as clinical  indications persist.     This report was finalized on 11/17/2020 5:42 PM by Dr. Emanuel Steiner M.D.       Scheduled Medications  aspirin, 81 mg, Oral, Daily  enoxaparin, 40 mg, Subcutaneous, Q24H  ipratropium-albuterol, 3 mL, Nebulization, Q8H - RT  isosorbide mononitrate, 30 mg, Oral, Daily  liothyronine, 25 mcg, Oral, Daily  lisinopril, 10 mg, Oral, Daily  metoprolol succinate XL, 50 mg, Oral, Daily  mirtazapine, 45 mg, Oral, Nightly  nicotine, 1 patch, Transdermal, Q24H  potassium chloride, 20 mEq, Oral, TID  QUEtiapine, 400 mg, Oral, Nightly  sennosides-docusate, 1 tablet, Oral, Daily  sodium chloride, 10 mL, Intravenous, Q12H    Infusions   Diet  Diet Regular; Cardiac       Assessment/Plan     Active Hospital Problems    Diagnosis  POA   • **Altered mental status [R41.82]  Yes   • COPD (chronic obstructive pulmonary disease) (CMS/HCC) [J44.9]  Yes   • Schizoaffective disorder, bipolar type (CMS/HCC) [F25.0]  Yes   • Essential hypertension [I10]  Yes   • Acquired hypothyroidism [E03.9]  Yes   • History of pulmonary embolus (PE) [Z86.711]  Yes      Resolved Hospital Problems   No resolved problems to display.       65 y.o. female admitted with Altered mental status.    AMS/Hx schizoaffective disorder/bipolar:   -No evidence of infection; CTH negative. ? Medication effects vs underlying psychiatric condition  -Access has seen; Dr. Irvin evaluated.  Previous [facility] admission 2011. Followed by [facility] psychiatry outpatient  -Will hold Ambien & hydrocodone; Dr. Irvin has adjusted meds, decreased Seroquel and PRN klonopin     COPD:  -Not in acute exacerbation  -Continue inhalers, O2  -CXR negative     HTN:  -Continue home meds  -BP controlled     Hypothyroidism:  -TSH 0.956 FT4 0.49 (3.440, 0.58 3/2020), T3  2.13  -liothyronine  -F/U PCP     Nursing verified with patient pharmacy; meds are current per their review    PT eval 11/19, pt only ambulated 20 ft, needed cues for safety. Messaged CCP re: discharge plan who spoke w/pt daughter and pt is not at baseline. Plan for discharge to SNF vs Home with Home health & family assistance tomorrow     · Lovenox 40 mg SC daily for DVT prophylaxis.  · Full code.  · Discussed with patient, CCP.  · Anticipate discharge TBD        EMELY Mcadams  [facility]  11/20/20  13:00 EST    Patient was wearing facemask when I entered the room and throughout our encounter.  I wore protective equipment throughout this patient encounter including a face mask, gloves and protective eyewear.  Hand hygiene was performed before donning protective equipment and after removal when leaving the room.

## 2020-11-21 VITALS
SYSTOLIC BLOOD PRESSURE: 110 MMHG | RESPIRATION RATE: 16 BRPM | HEIGHT: 66 IN | OXYGEN SATURATION: 91 % | WEIGHT: 233.2 LBS | DIASTOLIC BLOOD PRESSURE: 73 MMHG | HEART RATE: 76 BPM | BODY MASS INDEX: 37.48 KG/M2 | TEMPERATURE: 97.4 F

## 2020-11-21 PROBLEM — Z86.711 HISTORY OF PULMONARY EMBOLUS (PE): Status: RESOLVED | Noted: 2020-11-18 | Resolved: 2020-11-21

## 2020-11-21 PROBLEM — R41.82 ALTERED MENTAL STATUS: Status: RESOLVED | Noted: 2020-11-17 | Resolved: 2020-11-21

## 2020-11-21 PROCEDURE — 94799 UNLISTED PULMONARY SVC/PX: CPT

## 2020-11-21 PROCEDURE — 25010000002 ENOXAPARIN PER 10 MG: Performed by: NURSE PRACTITIONER

## 2020-11-21 PROCEDURE — G0378 HOSPITAL OBSERVATION PER HR: HCPCS

## 2020-11-21 PROCEDURE — 96372 THER/PROPH/DIAG INJ SC/IM: CPT

## 2020-11-21 RX ORDER — ACETAMINOPHEN 325 MG/1
650 TABLET ORAL EVERY 6 HOURS
Qty: 30 TABLET | Refills: 0
Start: 2020-11-21

## 2020-11-21 RX ORDER — QUETIAPINE FUMARATE 400 MG/1
400 TABLET, FILM COATED ORAL NIGHTLY
Qty: 30 TABLET | Refills: 0 | Status: SHIPPED | OUTPATIENT
Start: 2020-11-21

## 2020-11-21 RX ORDER — CLONAZEPAM 1 MG/1
0.5 TABLET ORAL 2 TIMES DAILY PRN
Qty: 30 TABLET | Refills: 0
Start: 2020-11-21

## 2020-11-21 RX ADMIN — IPRATROPIUM BROMIDE AND ALBUTEROL SULFATE 3 ML: 2.5; .5 SOLUTION RESPIRATORY (INHALATION) at 08:43

## 2020-11-21 RX ADMIN — LIOTHYRONINE SODIUM 25 MCG: 25 TABLET ORAL at 08:15

## 2020-11-21 RX ADMIN — POTASSIUM CHLORIDE 20 MEQ: 750 TABLET, EXTENDED RELEASE ORAL at 08:14

## 2020-11-21 RX ADMIN — LISINOPRIL 10 MG: 10 TABLET ORAL at 08:14

## 2020-11-21 RX ADMIN — ENOXAPARIN SODIUM 40 MG: 100 INJECTION SUBCUTANEOUS at 11:57

## 2020-11-21 RX ADMIN — ASPIRIN 81 MG: 81 TABLET, FILM COATED ORAL at 08:14

## 2020-11-21 RX ADMIN — METOPROLOL SUCCINATE 50 MG: 50 TABLET, EXTENDED RELEASE ORAL at 08:14

## 2020-11-21 RX ADMIN — DOCUSATE SODIUM 50MG AND SENNOSIDES 8.6MG 1 TABLET: 8.6; 5 TABLET, FILM COATED ORAL at 08:15

## 2020-11-21 RX ADMIN — NICOTINE 1 PATCH: 21 PATCH, EXTENDED RELEASE TRANSDERMAL at 01:40

## 2020-11-21 RX ADMIN — SODIUM CHLORIDE, PRESERVATIVE FREE 10 ML: 5 INJECTION INTRAVENOUS at 08:15

## 2020-11-21 RX ADMIN — ISOSORBIDE MONONITRATE 30 MG: 30 TABLET ORAL at 08:14

## 2020-11-21 NOTE — SIGNIFICANT NOTE
11/21/20 1117   OTHER   Discipline physical therapist   Rehab Time/Intention   Session Not Performed patient/family declined treatment  (Pt ref PT this am stating she has been trying to go home for 4-5 days and no one will tell her why she is here. Pt very confused and disoriented to time/place. Encouraged participation for imp balance/strength, and pt cont to ref. Nsg notified.)   Recommendation   PT - Next Appointment 11/22/20

## 2020-11-21 NOTE — DISCHARGE SUMMARY
Patient Name: Sarah Lopez  : 1955  MRN: [id number]    Date of Admission: 2020  Date of Discharge:  2020  Primary Care Physician: Dixon Miller MD      Chief Complaint:   Altered Mental Status and Weakness - Generalized      Discharge Diagnoses     Active Hospital Problems    Diagnosis  POA   • COPD (chronic obstructive pulmonary disease) (CMS/[facility]) [J44.9]  Yes   • Schizoaffective disorder, bipolar type (CMS/[facility]) [F25.0]  Yes   • Essential hypertension [I10]  Yes   • Acquired hypothyroidism [E03.9]  Yes      Resolved Hospital Problems    Diagnosis Date Resolved POA   • **Altered mental status [R41.82] 2020 Yes   • History of pulmonary embolus (PE) [Z86.711] 2020 Yes        Hospital Course     Ms. Lopez is a 65 y.o. female  presented to [facility] on 20 after family was concerned about altered mental status.    She has a history of COPD, CAD, HTN, depression/anxiety, bipolar/schizoaffective disorder, and hypothyroidism   Daughter reported on admit she has been diagnosis with bipolar/schizoaffective disorder and is followed by [facility] psychiatry.  She has apparently been managed well until recently when she had a hospitalization in October at [facility] due to COPD. Daughter has noticed more memory issues/forgetfulness for the past 6 months. Pt has been taking of her mother since October when she suffered a fall.  Please see the admitting history and physical for further details.  She was found to be very groggy on exam and daughter did report that she had noticed increased daytime sedation with her home med regimen.       Psychiatry was asked to see in made several adjustments in her medication to include reduction of her Seroquel and Klonopin.  She continued to have some daytime somnolence so her Ambien and hydrocodone were also discontinued.  Sh was obtained due to her history of hypothyroidism showing normal TSH slightly low free T4 and normal free  T3.  Dose of Cytomel was continued and it was recommended that she follow-up with PCP as an outpatient.  She has been on nasal cannula O2 as an outpatient since October after being admitted to [facility] for COPD exacerbation.  Clinically she did not appear to have any acute exacerbation in her home inhalers and O2 were continued.  Chest x-ray was negative for any acute findings.  Admission labs/CMP was unremarkable other than a mildly decreased BUN.  UA on admission showed trace ketones trace blood trace bacteria but no leukocytes WBCs or nitrites noted.  Admission BC showed mildly elevated hemoglobin at 16.2 and hematocrit of 48.6 which normalized on 11/19/2020 her home antihypertensives were also continued and BP remained well controlled.  On 11/21 patient was much more interactive with staff.  She was oriented and answered questions appropriately about her care.  She and her daughter both declined SNF after discharge.  Patient is currently on home health service and wanted to continue care tenders after discharge.  Patient refused physical therapy on the day of discharge.  She had been up in the room with rolling walker doing well.  Daughter reports the patient is able to care for herself quite well at home and is also hoping to care for patient's mother.  Reviewed medication changes with both daughter via phone and patient at the bedside.  Reiterated the fact that her altered mental status was likely due to medications and that when used in combination can increase sedative effects.  I reiterated the fact that Norco and Ambien both were discontinued on discharge she can use Tylenol for pain.    To follow-up with [facility] psychiatry in 1 to 2 weeks.   She is to follow-up with her primary care in 1 week.  She was instructed to make those appointments this coming Monday on 11/23/2020.        Day of Discharge     Subjective:      Review of Systems   Constitutional: Negative for chills and fever.   Respiratory: Negative  for chest tightness and shortness of breath.    Cardiovascular: Negative for chest pain and palpitations.   Gastrointestinal: Negative for abdominal pain, nausea and vomiting.          Physical Exam:  Temp:  [97.4 °F (36.3 °C)-98.7 °F (37.1 °C)] 97.4 °F (36.3 °C)  Heart Rate:  [72-88] 76  Resp:  [16-18] 16  BP: (107-115)/(66-73) 110/73  Body mass index is 37.64 kg/m².     Physical Exam     Vitals signs and nursing note reviewed.   Constitutional:       General: She is not in acute distress.     Appearance: She is obese. She is not ill-appearing.   HENT:      Head: Normocephalic and atraumatic.   Eyes:      General: Lids are normal. No scleral icterus.        Right eye: No discharge.         Left eye: No discharge.   Neck:      Musculoskeletal: Neck supple.   Cardiovascular:      Rate and Rhythm: Normal rate and regular rhythm.   Pulmonary:      Effort: Pulmonary effort is normal. No respiratory distress.      Breath sounds: Normal breath sounds.   Abdominal:      General: Bowel sounds are normal.      Palpations: Abdomen is soft.   Musculoskeletal:      Right lower leg: No edema.      Left lower leg: No edema.   Skin:     General: Skin is warm and dry.      Findings: No rash.   Neurological:      General: No focal deficit present.      Mental Status: She is alert.      Comments: Oriented to self, date, and place   Psychiatric:         Mood and Affect: Mood normal.         Behavior: Behavior normal.     Consultants     Consult Orders (all) (From admission, onward)     Start     Ordered    11/18/20 1841  Inpatient Psychiatrist Consult  Once     Specialty:  Psychiatry  Provider:  Beto Irvin III, MD    11/18/20 1841 11/17/20 2335  Inpatient Access Center Consult  Once     Provider:  (Not yet assigned)    11/17/20 2335 11/17/20 2335  Inpatient Neurology Consult General  Once     Specialty:  Neurology  Provider:  Rufus Umanzor MD    11/17/20 2335 11/17/20 2305  Inpatient Case Management   Consult  Once     Provider:  (Not yet assigned)    11/17/20 6863    11/17/20 1914  [facility] (on-call MD unless specified) Details  Once     Specialty:  Hospitalist  Provider:  (Not yet assigned)    11/17/20 1913              Procedures     Imaging Results (All)     Procedure Component Value Units Date/Time    CT Head Without Contrast [[id number]] Collected: 11/17/20 2001     Updated: 11/17/20 2020    Narrative:      CT HEAD WITHOUT CONTRAST     HISTORY: Confusion     TECHNIQUE:  Head CT includes axial imaging from the base of skull to the  vertex without intravenous contrast. Radiation dose reduction techniques  were utilized, including automated exposure control and exposure  modulation based on body size.     COMPARISON:None     FINDINGS: There are no abnormal areas of increased attenuation  intra-axially to suggest hemorrhage. No extra-axial fluid collection is  observed. There is no evidence for cerebral edema, mass effect or shift  of the midline structures. Bone windows demonstrate no calvarial  abnormality. Mastoid air cells and visualized paranasal sinuses appear  clear.        Impression:      No evidence for acute intracranial abnormality.     This report was finalized on 11/17/2020 8:16 PM by Dr. Truman Kelly M.D.       XR Chest 1 View [[id number]] Collected: 11/17/20 1741     Updated: 11/17/20 1746    Narrative:      XR CHEST 1 VW-     HISTORY: Female who is 65 years-old, weakness     TECHNIQUE: Frontal view of the chest     COMPARISON: None available     FINDINGS: The heart size is normal. Aorta is calcified. Slight  prominence of vascular and interstitial markings. No focal pulmonary  consolidation, pleural effusion, or pneumothorax. Right hemidiaphragm is  elevated. No acute osseous process.       Impression:      No focal pulmonary consolidation. Follow-up as clinical  indications persist.     This report was finalized on 11/17/2020 5:42 PM by Dr. Emanuel Steiner M.D.              Pertinent Labs     Results from last 7 days   Lab Units 11/19/20  0707 11/17/20  1818   WBC 10*3/mm3 5.52 6.18   HEMOGLOBIN g/dL 14.7 16.2*   PLATELETS 10*3/mm3 148 170     Results from last 7 days   Lab Units 11/19/20  0707 11/17/20  1818   SODIUM mmol/L 140 138   POTASSIUM mmol/L 4.2 4.1   CHLORIDE mmol/L 109* 105   CO2 mmol/L 23.8 24.3   BUN mg/dL 7* 7*   CREATININE mg/dL 0.76 0.89   GLUCOSE mg/dL 109* 97   Estimated Creatinine Clearance: 86.3 mL/min (by C-G formula based on SCr of 0.76 mg/dL).  Results from last 7 days   Lab Units 11/17/20  1818   ALBUMIN g/dL 3.50   BILIRUBIN mg/dL 0.6   ALK PHOS U/L 90   AST (SGOT) U/L 24   ALT (SGPT) U/L 15     Results from last 7 days   Lab Units 11/19/20  0707 11/17/20  1818   CALCIUM mg/dL 8.7 9.4   ALBUMIN g/dL  --  3.50   MAGNESIUM mg/dL  --  2.0       Results from last 7 days   Lab Units 11/17/20  1818   TROPONIN T ng/mL <0.010           Invalid input(s): LDLCALC        Test Results Pending at Discharge       Discharge Details        Discharge Medications      New Medications      Instructions Start Date   acetaminophen 325 MG tablet  Commonly known as: TYLENOL   650 mg, Oral, Every 6 Hours         Changes to Medications      Instructions Start Date   clonazePAM 1 MG tablet  Commonly known as: KlonoPIN  What changed:   · how much to take  · reasons to take this   0.5 mg, Oral, 2 Times Daily PRN      QUEtiapine 400 MG tablet  Commonly known as: SEROquel  What changed:   · medication strength  · how much to take  · additional instructions   400 mg, Oral, Nightly, FURTHER REFILLS BY PSYCHIATRIST         Continue These Medications      Instructions Start Date   aspirin 81 MG EC tablet   81 mg, Oral, Daily      isosorbide mononitrate 30 MG 24 hr tablet  Commonly known as: IMDUR   30 mg, Oral, Daily      liothyronine 25 MCG tablet  Commonly known as: CYTOMEL   1 tablet, Oral, Daily      lisinopril 10 MG tablet  Commonly known as: PRINIVIL,ZESTRIL   10 mg, Oral, Daily       metoprolol succinate XL 50 MG 24 hr tablet  Commonly known as: TOPROL-XL   50 mg, Oral, Daily      mirtazapine 45 MG tablet  Commonly known as: REMERON   1 tablet, Oral, Every Night at Bedtime      nicotine 21 MG/24HR patch  Commonly known as: NICODERM CQ   1 patch, Transdermal, Every 24 Hours      potassium chloride 20 MEQ CR tablet  Commonly known as: K-DUR,KLOR-CON   20 mEq, Oral, 3 Times Daily      Senexon-S 8.6-50 MG per tablet  Generic drug: sennosides-docusate   1 tablet, Oral, Daily         Stop These Medications    Ambien 10 MG tablet  Generic drug: zolpidem     HYDROcodone-acetaminophen 7.5-325 MG per tablet  Commonly known as: NORCO            No Known Allergies      Dis[place]  [place] [facility] [place] Diet:  No active diet order    She will continue with home health with care tenders.  She will continue on nasal cannula O2 at 2 L continuous as per her previous home O2 orders.    Discharge Activity:   Activity Instructions     Activity as Tolerated      With walker          CODE STATUS:    Code Status and Medical Interventions:   Ordered at: 11/18/20 1113     Code Status:    CPR     Medical Interventions (Level of Support Prior to Arrest):    Full       No future appointments.  Follow-up Information     Dixon Miller MD Follow up in 1 week(s).    Specialty: Internal Medicine  Why: call office Monday 11/23/20 to set up 1 week follow up.   Contact information:  [address] [place], [place] [id number] [phone]             [facility]ry Follow up in 1 week(s).    Why: call office Monday 11/23/20 to Follow up with your established [facility] psychiatrist for a 1-2 week follow up.                  Time Spent on Discharge:  Greater than 30 minutes spent in coordination of care with pts daughter and RN along with bedside teaching with pt.       EMELY Wei  [facility]  11/21/20  13:52 EST

## 2020-11-21 NOTE — PROGRESS NOTES
Name: Sarah Lopez ADMIT: 2020   : 1955  PCP: Dixon Miller MD    MRN: [id number] LOS: 0 days   AGE/SEX: 65 y.o. female  ROOM: [facility]     Subjective   Subjective   Drowsy again this morning. Wakes easily but unable to stay awake. Had not eaten breakfast. Was slouched in bed but stated she was comfortable. PT evaluation reviewed from yesterday.    Review of Systems   Respiratory: Negative for cough and shortness of breath.    Cardiovascular: Negative for chest pain and leg swelling.   Gastrointestinal: Negative for abdominal pain, nausea and vomiting.   Neurological: Negative for dizziness and light-headedness.        Objective   Objective   Vital Signs  Temp:  [97.4 °F (36.3 °C)-98.7 °F (37.1 °C)] 97.4 °F (36.3 °C)  Heart Rate:  [70-88] 76  Resp:  [16-18] 16  BP: (107-115)/(66-73) 110/73  SpO2:  [92 %-99 %] 92 %  on  Flow (L/min):  [2] 2;   Device (Oxygen Therapy): nasal cannula  Body mass index is 37.64 kg/m².     Physical Exam  Vitals signs and nursing note reviewed.   Constitutional:       General: She is not in acute distress.     Appearance: She is obese. She is not ill-appearing.   HENT:      Head: Normocephalic and atraumatic.   Eyes:      General: Lids are normal. No scleral icterus.        Right eye: No discharge.         Left eye: No discharge.   Neck:      Musculoskeletal: Neck supple.   Cardiovascular:      Rate and Rhythm: Normal rate and regular rhythm.   Pulmonary:      Effort: Pulmonary effort is normal. No respiratory distress.      Breath sounds: Normal breath sounds.   Abdominal:      General: Bowel sounds are normal.      Palpations: Abdomen is soft.   Musculoskeletal:      Right lower leg: No edema.      Left lower leg: No edema.   Skin:     General: Skin is warm and dry.      Findings: No rash.   Neurological:      General: No focal deficit present.      Mental Status: She is alert.      Comments: Oriented to self, date, and place   Psychiatric:         Mood and  Affect: Mood normal.         Behavior: Behavior normal.         Results Review     I reviewed the patient's new clinical results.  Results from last 7 days   Lab Units 11/19/20  0707 11/17/20  1818   WBC 10*3/mm3 5.52 6.18   HEMOGLOBIN g/dL 14.7 16.2*   PLATELETS 10*3/mm3 148 170     Results from last 7 days   Lab Units 11/19/20  0707 11/17/20  1818   SODIUM mmol/L 140 138   POTASSIUM mmol/L 4.2 4.1   CHLORIDE mmol/L 109* 105   CO2 mmol/L 23.8 24.3   BUN mg/dL 7* 7*   CREATININE mg/dL 0.76 0.89   GLUCOSE mg/dL 109* 97   Estimated Creatinine Clearance: 86.3 mL/min (by C-G formula based on SCr of 0.76 mg/dL).  Results from last 7 days   Lab Units 11/17/20  1818   ALBUMIN g/dL 3.50   BILIRUBIN mg/dL 0.6   ALK PHOS U/L 90   AST (SGOT) U/L 24   ALT (SGPT) U/L 15     Results from last 7 days   Lab Units 11/19/20  0707 11/17/20  1818   CALCIUM mg/dL 8.7 9.4   ALBUMIN g/dL  --  3.50   MAGNESIUM mg/dL  --  2.0       COVID19   Date Value Ref Range Status   11/17/2020 Not Detected Not Detected - Ref. Range Final     Glucose   Date/Time Value Ref Range Status   11/20/2020 1101 117 70 - 130 mg/dL Final   11/19/2020 0606 100 70 - 130 mg/dL Final   11/18/2020 2110 94 70 - 130 mg/dL Final       CT Head Without Contrast  Narrative: CT HEAD WITHOUT CONTRAST     HISTORY: Confusion     TECHNIQUE:  Head CT includes axial imaging from the base of skull to the  vertex without intravenous contrast. Radiation dose reduction techniques  were utilized, including automated exposure control and exposure  modulation based on body size.     COMPARISON:None     FINDINGS: There are no abnormal areas of increased attenuation  intra-axially to suggest hemorrhage. No extra-axial fluid collection is  observed. There is no evidence for cerebral edema, mass effect or shift  of the midline structures. Bone windows demonstrate no calvarial  abnormality. Mastoid air cells and visualized paranasal sinuses appear  clear.      Impression: No evidence for acute  intracranial abnormality.     This report was finalized on 11/17/2020 8:16 PM by Dr. Truman Kelly M.D.     XR Chest 1 View  Narrative: XR CHEST 1 VW-     HISTORY: Female who is 65 years-old, weakness     TECHNIQUE: Frontal view of the chest     COMPARISON: None available     FINDINGS: The heart size is normal. Aorta is calcified. Slight  prominence of vascular and interstitial markings. No focal pulmonary  consolidation, pleural effusion, or pneumothorax. Right hemidiaphragm is  elevated. No acute osseous process.     Impression: No focal pulmonary consolidation. Follow-up as clinical  indications persist.     This report was finalized on 11/17/2020 5:42 PM by Dr. Emanuel Steiner M.D.       Scheduled Medications  aspirin, 81 mg, Oral, Daily  enoxaparin, 40 mg, Subcutaneous, Q24H  ipratropium-albuterol, 3 mL, Nebulization, Q8H - RT  isosorbide mononitrate, 30 mg, Oral, Daily  liothyronine, 25 mcg, Oral, Daily  lisinopril, 10 mg, Oral, Daily  metoprolol succinate XL, 50 mg, Oral, Daily  mirtazapine, 45 mg, Oral, Nightly  nicotine, 1 patch, Transdermal, Q24H  potassium chloride, 20 mEq, Oral, TID  QUEtiapine, 400 mg, Oral, Nightly  sennosides-docusate, 1 tablet, Oral, Daily  sodium chloride, 10 mL, Intravenous, Q12H    Infusions   Diet  Diet Regular; Cardiac       Assessment/Plan     Active Hospital Problems    Diagnosis  POA   • **Altered mental status [R41.82]  Yes   • COPD (chronic obstructive pulmonary disease) (CMS/HCC) [J44.9]  Yes   • Schizoaffective disorder, bipolar type (CMS/HCC) [F25.0]  Yes   • Essential hypertension [I10]  Yes   • Acquired hypothyroidism [E03.9]  Yes   • History of pulmonary embolus (PE) [Z86.711]  Yes      Resolved Hospital Problems   No resolved problems to display.       65 y.o. female admitted with Altered mental status.    AMS/Hx schizoaffective disorder/bipolar:   -No evidence of infection; CTH negative. Possible Medication effects vs underlying psychiatric  condition  -Access has seen; Dr. Irvin evaluated.  Previous [facility] admission 2011. [facility]  -her  Ambien & hydrocodone were held 11/20 d/t drowsiness, more alert today; Dr. Irvin has adjusted meds - decreased Seroquel and PRN klonopin     COPD:  -Not in acute exacerbation  - her inhalers have continued, she is on O2 at 2 liters/ nc and has CPAP at home.  Continue 02.     - CXR showed no focal consolidation, noted right hemidiaphragm elevation.      HTN:  -Continue home antihypertensive meds  -BP controlled     Hypothyroidism:  -TSH 0.956 FT4 0.49 (3.440, 0.58 3/2020), T3 2.13  - home liothyronine continued   -F/U PCP     Nursing verified with patient pharmacy; meds are current per their review    PT eval 11/19, pt only ambulated 20 ft, needed cues for safety. Messaged [facility] re: discharge plan who spoke w/pt daughter and pt is not at baseline. Plan for discharge to SNF vs Home with Home health & family assistance soon.  Waiting finalization of plan from daughter per [facility] 11/20 note.      · Lovenox 40 mg SC daily for DVT prophylaxis.  · Full code.  · Discussed with patient.   · Anticipate discharge TBD        EMELY Wei  [facility]  11/21/20  09:48 EST    Patient was wearing facemask when I entered the room and throughout our encounter.  I wore protective equipment throughout this patient encounter including a face mask, gloves and protective eyewear.  Hand hygiene was performed before donning protective equipment and after removal when leaving the room.

## 2020-11-21 NOTE — PROGRESS NOTES
Continued Stay Note  [facility]     Patient Name: Sarah Lopez  MRN: [id number]  Today's Date: 11/21/2020    Admit Date: 11/17/2020    Discharge Plan     Row Name 11/21/20 1209       Plan    Plan Comments  Inbound call form patient's daughter inquiring when patient will be discharged. And also stated that patient will not go to rehab, that she will return home where she is staying with her mother. She current with [facility] and they will follow at [place]. Patient has home oxygen already in place. Family will transport at DC. Daughter demanding to know exactly when patient will be discharged. Explained CCP does not have an exact date but per APRN notes she could be ready today. Daughter demanded to be called as soon as there was an order for DC. Confirmed number on the chart is correct phone number. Spoke with patient's nurse Chantale and she has already sent a message to the APRN for DC orders and she will notify patient's daughter. Rbeecca Anderson RN        Discharge Codes    No documentation.             Reebcca Anderson RN

## 2020-11-21 NOTE — PLAN OF CARE
Problem: Adult Inpatient Plan of Care  Goal: Plan of Care Review  Outcome: Ongoing, Progressing  Flowsheets  Taken 11/21/2020 0507 by Renu Nelson, RN  Outcome Summary: VSS. O2 remains at 2L. Up with assist to BR, amb well. Bed alarm in place, forgets to call before she gets up. No complaints, rested well.  Taken 11/20/2020 0318 by Delicia Ochoa RN  Plan of Care Reviewed With: patient  Goal: Patient-Specific Goal (Individualized)  Outcome: Ongoing, Progressing  Goal: Absence of Hospital-Acquired Illness or Injury  Outcome: Ongoing, Progressing  Intervention: Identify and Manage Fall Risk  Recent Flowsheet Documentation  Taken 11/21/2020 0402 by Renu Nelson, RN  Safety Promotion/Fall Prevention:   safety round/check completed   fall prevention program maintained  Taken 11/21/2020 0200 by Renu Nelson, RN  Safety Promotion/Fall Prevention:   safety round/check completed   fall prevention program maintained  Taken 11/21/2020 0005 by Renu Nelson RN  Safety Promotion/Fall Prevention:   safety round/check completed   fall prevention program maintained  Taken 11/20/2020 2202 by Renu Nelson, RN  Safety Promotion/Fall Prevention:   safety round/check completed   fall prevention program maintained  Taken 11/20/2020 2043 by Renu Nelson RN  Safety Promotion/Fall Prevention:   safety round/check completed   room organization consistent   nonskid shoes/slippers when out of bed   muscle strengthening facilitated   fall prevention program maintained   assistive device/personal items within reach   clutter free environment maintained  Intervention: Prevent Skin Injury  Recent Flowsheet Documentation  Taken 11/21/2020 0402 by Renu Nelson RN  Body Position: position changed independently  Taken 11/21/2020 0200 by Renu Nelson, RN  Body Position:   side-lying, left   position changed independently  Taken 11/21/2020 0005 by Renu Nelson, RN  Body Position: position changed independently  Taken  11/20/2020 2202 by Renu Nelson RN  Body Position: position changed independently  Taken 11/20/2020 2043 by Renu Nelson RN  Body Position: position changed independently  Intervention: Prevent and Manage VTE (venous thromboembolism) Risk  Recent Flowsheet Documentation  Taken 11/20/2020 2043 by Renu Nelson RN  VTE Prevention/Management: (lovenox) dorsiflexion/plantar flexion performed  Goal: Optimal Comfort and Wellbeing  Outcome: Ongoing, Progressing  Intervention: Provide Person-Centered Care  Recent Flowsheet Documentation  Taken 11/20/2020 2043 by Renu Nelson RN  Trust Relationship/Rapport: care explained  Goal: Readiness for Transition of Care  Outcome: Ongoing, Progressing     Problem: Fall Injury Risk  Goal: Absence of Fall and Fall-Related Injury  Outcome: Ongoing, Progressing  Intervention: Promote Injury-Free Environment  Recent Flowsheet Documentation  Taken 11/21/2020 0402 by Renu Nelson RN  Safety Promotion/Fall Prevention:   safety round/check completed   fall prevention program maintained  Taken 11/21/2020 0200 by Renu Nelson RN  Safety Promotion/Fall Prevention:   safety round/check completed   fall prevention program maintained  Taken 11/21/2020 0005 by Renu Nelson RN  Safety Promotion/Fall Prevention:   safety round/check completed   fall prevention program maintained  Taken 11/20/2020 2202 by Renu Nelson RN  Safety Promotion/Fall Prevention:   safety round/check completed   fall prevention program maintained  Taken 11/20/2020 2043 by Renu Nelson, RN  Safety Promotion/Fall Prevention:   safety round/check completed   room organization consistent   nonskid shoes/slippers when out of bed   muscle strengthening facilitated   fall prevention program maintained   assistive device/personal items within reach   clutter free environment maintained     Problem: Skin Injury Risk Increased  Goal: Skin Health and Integrity  Outcome: Ongoing, Progressing  Intervention:  Optimize Skin Protection  Recent Flowsheet Documentation  Taken 11/21/2020 0200 by Renu Nelson, RN  Head of Bed (HOB): HOB elevated  Taken 11/20/2020 2202 by Renu Nelson, RN  Head of Bed (HOB): HOB at 15 degrees  Taken 11/20/2020 2043 by Renu Nelson, RN  Pressure Reduction Techniques: frequent weight shift encouraged  Head of Bed (HOB): HOB at 15 degrees  Pressure Reduction Devices: alternating pressure pump (ADD)  Skin Protection: adhesive use limited     Problem: COPD Comorbidity  Goal: Maintenance of COPD Symptom Control  Outcome: Ongoing, Progressing     Problem: Obstructive Sleep Apnea Risk or Actual (Comorbidity Management)  Goal: Unobstructed Breathing During Sleep  Outcome: Ongoing, Progressing     Problem: Pain Chronic (Persistent) (Comorbidity Management)  Goal: Acceptable Pain Control and Functional Ability  Outcome: Ongoing, Progressing   Goal Outcome Evaluation:  Plan of Care Reviewed With: patient  Progress: improving  Outcome Summary: VSS. O2 remains at 2L. Up with assist to BR, amb well. Bed alarm in place, forgets to call before she gets up. No complaints, rested well.

## 2020-11-22 NOTE — PROGRESS NOTES
Case Management Discharge Note      Final Note: Patient DC'd home with [facility]     Provided Post Acute Provider List?: N/A  N/A Provider List Comment: Denies needs. Plan is home    Selected Continued Care - Discharged on 11/21/2020 Admission date: 11/17/2020 - Discharge disposition: Home-Health Care Svc    Destination    No services have been selected for the patient.              Durable Medical Equipment    No services have been selected for the patient.              Dialysis/Infusion    No services have been selected for the patient.              Home Medical Care     Service Provider Selected Services Address Phone Fax Patient Preferred    [facility] [address], [address], [place] [address] [phone] --          Therapy    No services have been selected for the patient.              Community Resources    No services have been selected for the patient.                  Transportation Services  Private: Car    Final Discharge Disposition Code: 06 - home with home health care

## 2021-06-27 ENCOUNTER — HOSPITAL ENCOUNTER (EMERGENCY)
Facility: HOSPITAL | Age: 66
Discharge: HOME | End: 2021-06-27
Attending: EMERGENCY MEDICINE | Admitting: EMERGENCY MEDICINE

## 2021-06-27 ENCOUNTER — APPOINTMENT (OUTPATIENT)
Dept: CT IMAGING | Facility: HOSPITAL | Age: 66
End: 2021-06-27

## 2021-06-27 VITALS
HEART RATE: 83 BPM | SYSTOLIC BLOOD PRESSURE: 109 MMHG | RESPIRATION RATE: 18 BRPM | DIASTOLIC BLOOD PRESSURE: 66 MMHG | HEIGHT: 66 IN | WEIGHT: 164 LBS | BODY MASS INDEX: 26.36 KG/M2 | TEMPERATURE: 97.2 F | OXYGEN SATURATION: 94 %

## 2021-06-27 DIAGNOSIS — R19.7 DIARRHEA, UNSPECIFIED TYPE: Primary | ICD-10-CM

## 2021-06-27 LAB
ALBUMIN SERPL-MCNC: 3.5 G/DL (ref 3.5–5.2)
ALBUMIN/GLOB SERPL: 1.1 G/DL
ALP SERPL-CCNC: 93 U/L (ref 39–117)
ALT SERPL W P-5'-P-CCNC: 8 U/L (ref 1–33)
ANION GAP SERPL CALCULATED.3IONS-SCNC: 9.1 MMOL/L (ref 5–15)
AST SERPL-CCNC: 12 U/L (ref 1–32)
BASOPHILS # BLD AUTO: 0.06 10*3/MM3 (ref 0–0.2)
BASOPHILS NFR BLD AUTO: 0.8 % (ref 0–1.5)
BILIRUB SERPL-MCNC: 0.4 MG/DL (ref 0–1.2)
BUN SERPL-MCNC: 6 MG/DL (ref 8–23)
BUN/CREAT SERPL: 7.1 (ref 7–25)
CALCIUM SPEC-SCNC: 8.9 MG/DL (ref 8.6–10.5)
CHLORIDE SERPL-SCNC: 104 MMOL/L (ref 98–107)
CO2 SERPL-SCNC: 21.9 MMOL/L (ref 22–29)
CREAT SERPL-MCNC: 0.85 MG/DL (ref 0.57–1)
DEPRECATED RDW RBC AUTO: 46.9 FL (ref 37–54)
EOSINOPHIL # BLD AUTO: 0.14 10*3/MM3 (ref 0–0.4)
EOSINOPHIL NFR BLD AUTO: 1.9 % (ref 0.3–6.2)
ERYTHROCYTE [DISTWIDTH] IN BLOOD BY AUTOMATED COUNT: 13.2 % (ref 12.3–15.4)
GFR SERPL CREATININE-BSD FRML MDRD: 81 ML/MIN/1.73
GLOBULIN UR ELPH-MCNC: 3.3 GM/DL
GLUCOSE SERPL-MCNC: 107 MG/DL (ref 65–99)
HCT VFR BLD AUTO: 41.5 % (ref 34–46.6)
HGB BLD-MCNC: 13.8 G/DL (ref 12–15.9)
IMM GRANULOCYTES # BLD AUTO: 0.01 10*3/MM3 (ref 0–0.05)
IMM GRANULOCYTES NFR BLD AUTO: 0.1 % (ref 0–0.5)
LYMPHOCYTES # BLD AUTO: 2.6 10*3/MM3 (ref 0.7–3.1)
LYMPHOCYTES NFR BLD AUTO: 34.9 % (ref 19.6–45.3)
MCH RBC QN AUTO: 32.2 PG (ref 26.6–33)
MCHC RBC AUTO-ENTMCNC: 33.3 G/DL (ref 31.5–35.7)
MCV RBC AUTO: 96.7 FL (ref 79–97)
MONOCYTES # BLD AUTO: 0.28 10*3/MM3 (ref 0.1–0.9)
MONOCYTES NFR BLD AUTO: 3.8 % (ref 5–12)
NEUTROPHILS NFR BLD AUTO: 4.36 10*3/MM3 (ref 1.7–7)
NEUTROPHILS NFR BLD AUTO: 58.5 % (ref 42.7–76)
NRBC BLD AUTO-RTO: 0 /100 WBC (ref 0–0.2)
PLATELET # BLD AUTO: 195 10*3/MM3 (ref 140–450)
PMV BLD AUTO: 11.3 FL (ref 6–12)
POTASSIUM SERPL-SCNC: 3.6 MMOL/L (ref 3.5–5.2)
PROT SERPL-MCNC: 6.8 G/DL (ref 6–8.5)
RBC # BLD AUTO: 4.29 10*6/MM3 (ref 3.77–5.28)
SODIUM SERPL-SCNC: 135 MMOL/L (ref 136–145)
WBC # BLD AUTO: 7.45 10*3/MM3 (ref 3.4–10.8)

## 2021-06-27 PROCEDURE — 25010000002 IOPAMIDOL 61 % SOLUTION: Performed by: EMERGENCY MEDICINE

## 2021-06-27 PROCEDURE — 85025 COMPLETE CBC W/AUTO DIFF WBC: CPT | Performed by: EMERGENCY MEDICINE

## 2021-06-27 PROCEDURE — 96374 THER/PROPH/DIAG INJ IV PUSH: CPT

## 2021-06-27 PROCEDURE — 80053 COMPREHEN METABOLIC PANEL: CPT | Performed by: EMERGENCY MEDICINE

## 2021-06-27 PROCEDURE — 74177 CT ABD & PELVIS W/CONTRAST: CPT

## 2021-06-27 PROCEDURE — 25010000002 ONDANSETRON PER 1 MG

## 2021-06-27 PROCEDURE — 99283 EMERGENCY DEPT VISIT LOW MDM: CPT

## 2021-06-27 RX ORDER — ONDANSETRON 2 MG/ML
4 INJECTION INTRAMUSCULAR; INTRAVENOUS ONCE
Status: COMPLETED | OUTPATIENT
Start: 2021-06-27 | End: 2021-06-27

## 2021-06-27 RX ORDER — ONDANSETRON 2 MG/ML
INJECTION INTRAMUSCULAR; INTRAVENOUS
Status: COMPLETED
Start: 2021-06-27 | End: 2021-06-27

## 2021-06-27 RX ADMIN — IOPAMIDOL 85 ML: 612 INJECTION, SOLUTION INTRAVENOUS at 17:56

## 2021-06-27 RX ADMIN — SODIUM CHLORIDE 500 ML: 9 INJECTION, SOLUTION INTRAVENOUS at 16:25

## 2021-06-27 RX ADMIN — ONDANSETRON 4 MG: 2 INJECTION INTRAMUSCULAR; INTRAVENOUS at 17:14
